# Patient Record
Sex: MALE | Race: WHITE | NOT HISPANIC OR LATINO | Employment: PART TIME | URBAN - METROPOLITAN AREA
[De-identification: names, ages, dates, MRNs, and addresses within clinical notes are randomized per-mention and may not be internally consistent; named-entity substitution may affect disease eponyms.]

---

## 2021-01-17 ENCOUNTER — HOSPITAL ENCOUNTER (EMERGENCY)
Facility: HOSPITAL | Age: 56
End: 2021-01-19
Attending: EMERGENCY MEDICINE
Payer: COMMERCIAL

## 2021-01-17 DIAGNOSIS — R45.851 SUICIDAL IDEATIONS: ICD-10-CM

## 2021-01-17 DIAGNOSIS — F10.20 ALCOHOL USE DISORDER, SEVERE, DEPENDENCE (HCC): Primary | ICD-10-CM

## 2021-01-17 DIAGNOSIS — F32.A DEPRESSION, UNSPECIFIED DEPRESSION TYPE: ICD-10-CM

## 2021-01-17 LAB
AMPHETAMINES SERPL QL SCN: NEGATIVE
BARBITURATES UR QL: NEGATIVE
BENZODIAZ UR QL: NEGATIVE
COCAINE UR QL: NEGATIVE
ETHANOL EXG-MCNC: NORMAL MG/DL
ETHANOL SERPL-MCNC: 380 MG/DL (ref 0–3)
METHADONE UR QL: NEGATIVE
OPIATES UR QL SCN: NEGATIVE
OXYCODONE+OXYMORPHONE UR QL SCN: NEGATIVE
PCP UR QL: NEGATIVE
THC UR QL: NEGATIVE

## 2021-01-17 PROCEDURE — 99285 EMERGENCY DEPT VISIT HI MDM: CPT

## 2021-01-17 PROCEDURE — 82075 ASSAY OF BREATH ETHANOL: CPT | Performed by: EMERGENCY MEDICINE

## 2021-01-17 PROCEDURE — 99285 EMERGENCY DEPT VISIT HI MDM: CPT | Performed by: EMERGENCY MEDICINE

## 2021-01-17 PROCEDURE — 80307 DRUG TEST PRSMV CHEM ANLYZR: CPT | Performed by: EMERGENCY MEDICINE

## 2021-01-17 PROCEDURE — 82077 ASSAY SPEC XCP UR&BREATH IA: CPT | Performed by: EMERGENCY MEDICINE

## 2021-01-17 PROCEDURE — 36415 COLL VENOUS BLD VENIPUNCTURE: CPT | Performed by: EMERGENCY MEDICINE

## 2021-01-17 RX ORDER — LORAZEPAM 1 MG/1
1 TABLET ORAL EVERY 6 HOURS PRN
Status: DISCONTINUED | OUTPATIENT
Start: 2021-01-17 | End: 2021-01-19 | Stop reason: HOSPADM

## 2021-01-17 RX ADMIN — LORAZEPAM 1 MG: 1 TABLET ORAL at 20:51

## 2021-01-18 LAB
ANION GAP SERPL CALCULATED.3IONS-SCNC: 13 MMOL/L (ref 4–13)
ATRIAL RATE: 113 BPM
BASOPHILS # BLD AUTO: 0.05 THOUSANDS/ΜL (ref 0–0.1)
BASOPHILS NFR BLD AUTO: 2 % (ref 0–1)
BILIRUB UR QL STRIP: NEGATIVE
BUN SERPL-MCNC: 13 MG/DL (ref 5–25)
CALCIUM SERPL-MCNC: 9.3 MG/DL (ref 8.3–10.1)
CHLORIDE SERPL-SCNC: 101 MMOL/L (ref 100–108)
CLARITY UR: CLEAR
CO2 SERPL-SCNC: 26 MMOL/L (ref 21–32)
COLOR UR: YELLOW
CREAT SERPL-MCNC: 0.73 MG/DL (ref 0.6–1.3)
EOSINOPHIL # BLD AUTO: 0.02 THOUSAND/ΜL (ref 0–0.61)
EOSINOPHIL NFR BLD AUTO: 1 % (ref 0–6)
ERYTHROCYTE [DISTWIDTH] IN BLOOD BY AUTOMATED COUNT: 12.9 % (ref 11.6–15.1)
FLUAV RNA RESP QL NAA+PROBE: NEGATIVE
FLUBV RNA RESP QL NAA+PROBE: NEGATIVE
GFR SERPL CREATININE-BSD FRML MDRD: 103 ML/MIN/1.73SQ M
GLUCOSE SERPL-MCNC: 97 MG/DL (ref 65–140)
GLUCOSE UR STRIP-MCNC: NEGATIVE MG/DL
HCT VFR BLD AUTO: 42.2 % (ref 36.5–49.3)
HGB BLD-MCNC: 14.9 G/DL (ref 12–17)
HGB UR QL STRIP.AUTO: NEGATIVE
IMM GRANULOCYTES # BLD AUTO: 0 THOUSAND/UL (ref 0–0.2)
IMM GRANULOCYTES NFR BLD AUTO: 0 % (ref 0–2)
KETONES UR STRIP-MCNC: ABNORMAL MG/DL
LEUKOCYTE ESTERASE UR QL STRIP: NEGATIVE
LYMPHOCYTES # BLD AUTO: 0.55 THOUSANDS/ΜL (ref 0.6–4.47)
LYMPHOCYTES NFR BLD AUTO: 19 % (ref 14–44)
MCH RBC QN AUTO: 32.9 PG (ref 26.8–34.3)
MCHC RBC AUTO-ENTMCNC: 35.3 G/DL (ref 31.4–37.4)
MCV RBC AUTO: 93 FL (ref 82–98)
MONOCYTES # BLD AUTO: 0.36 THOUSAND/ΜL (ref 0.17–1.22)
MONOCYTES NFR BLD AUTO: 13 % (ref 4–12)
NEUTROPHILS # BLD AUTO: 1.91 THOUSANDS/ΜL (ref 1.85–7.62)
NEUTS SEG NFR BLD AUTO: 65 % (ref 43–75)
NITRITE UR QL STRIP: NEGATIVE
NRBC BLD AUTO-RTO: 0 /100 WBCS
P AXIS: 69 DEGREES
PH UR STRIP.AUTO: 7 [PH]
PLATELET # BLD AUTO: 94 THOUSANDS/UL (ref 149–390)
PMV BLD AUTO: 9.9 FL (ref 8.9–12.7)
POTASSIUM SERPL-SCNC: 4.1 MMOL/L (ref 3.5–5.3)
PR INTERVAL: 130 MS
PROT UR STRIP-MCNC: NEGATIVE MG/DL
QRS AXIS: 73 DEGREES
QRSD INTERVAL: 92 MS
QT INTERVAL: 354 MS
QTC INTERVAL: 485 MS
RBC # BLD AUTO: 4.53 MILLION/UL (ref 3.88–5.62)
RSV RNA RESP QL NAA+PROBE: NEGATIVE
SARS-COV-2 RNA RESP QL NAA+PROBE: NEGATIVE
SODIUM SERPL-SCNC: 140 MMOL/L (ref 136–145)
SP GR UR STRIP.AUTO: 1.02 (ref 1–1.03)
T WAVE AXIS: 55 DEGREES
TSH SERPL DL<=0.05 MIU/L-ACNC: 0.77 UIU/ML (ref 0.36–3.74)
UROBILINOGEN UR QL STRIP.AUTO: 1 E.U./DL
VENTRICULAR RATE: 113 BPM
WBC # BLD AUTO: 2.89 THOUSAND/UL (ref 4.31–10.16)

## 2021-01-18 PROCEDURE — 84443 ASSAY THYROID STIM HORMONE: CPT | Performed by: EMERGENCY MEDICINE

## 2021-01-18 PROCEDURE — 96374 THER/PROPH/DIAG INJ IV PUSH: CPT

## 2021-01-18 PROCEDURE — 36415 COLL VENOUS BLD VENIPUNCTURE: CPT | Performed by: EMERGENCY MEDICINE

## 2021-01-18 PROCEDURE — 85025 COMPLETE CBC W/AUTO DIFF WBC: CPT | Performed by: EMERGENCY MEDICINE

## 2021-01-18 PROCEDURE — 0241U HB NFCT DS VIR RESP RNA 4 TRGT: CPT | Performed by: EMERGENCY MEDICINE

## 2021-01-18 PROCEDURE — 93010 ELECTROCARDIOGRAM REPORT: CPT | Performed by: INTERNAL MEDICINE

## 2021-01-18 PROCEDURE — 80048 BASIC METABOLIC PNL TOTAL CA: CPT | Performed by: EMERGENCY MEDICINE

## 2021-01-18 PROCEDURE — 81003 URINALYSIS AUTO W/O SCOPE: CPT | Performed by: EMERGENCY MEDICINE

## 2021-01-18 PROCEDURE — 93005 ELECTROCARDIOGRAM TRACING: CPT

## 2021-01-18 RX ORDER — LORAZEPAM 2 MG/ML
4 INJECTION INTRAMUSCULAR ONCE
Status: COMPLETED | OUTPATIENT
Start: 2021-01-18 | End: 2021-01-18

## 2021-01-18 RX ADMIN — LORAZEPAM 4 MG: 2 INJECTION INTRAMUSCULAR; INTRAVENOUS at 11:21

## 2021-01-18 RX ADMIN — LORAZEPAM 1 MG: 1 TABLET ORAL at 23:17

## 2021-01-18 RX ADMIN — LORAZEPAM 1 MG: 1 TABLET ORAL at 17:45

## 2021-01-18 NOTE — ED NOTES
Pt room clear of safety hazards  Patient placed on 1:1 observation and was placed in paper scrubs per hospital policy        Bri Ge  01/17/21 2019

## 2021-01-18 NOTE — ED NOTES
Pt OOB and ambulatory to the bathroom  Pt provided a urine sample and was given the tv remote        GroundLinkrita Search  01/18/21 2370

## 2021-01-18 NOTE — ED PROVIDER NOTES
History  Chief Complaint   Patient presents with    Psychiatric Evaluation     suicidal ideation found with guns and knives sts he is gonna kill himself in 24 hrs      68-year-old male presents for psychiatric evaluation  The patient arrives in police custody under a 302 petition  36 petition is from the patient's mother who states that over the past several days the patient has been making comments through text messages to family members that he cannot take it anymore and is going to [place] off into the woods and ended    He has also made comments suggesting that family members should self some of his personal items after he has gone  The patient admits the same to me on my interview  He states that he has had lots of problems with carpal tunnel in his right wrist and can no longer work as a   He states that he can no longer live with the pain and is just done with it all    He does own multiple firearms and refers to one of them as his baby    He also admits to drinking vodka today  He denies any other ingestions or illicit drugs  History provided by:  Patient   used: No    Psychiatric Evaluation      None       Past Medical History:   Diagnosis Date    Substance abuse [facility])        Past Surgical History:   Procedure Laterality Date    CARPAL TUNNEL RELEASE Right 12/2020       Family History   Problem Relation Age of Onset    No Known Problems Mother     Cancer Father      I have reviewed and agree with the history as documented      E-Cigarette/Vaping    E-Cigarette Use Never User      E-Cigarette/Vaping Substances    Nicotine No     THC No     CBD No     Flavoring No     Other No     Unknown No      Social History     Tobacco Use    Smoking status: Current Every Day Smoker     Packs/day: 1 00    Smokeless tobacco: Never Used   Substance Use Topics    Alcohol use: Yes     Frequency: 4 or more times a week     Drinks per session: 10 or more     Comment: rufino reprots drinking daily about a "handle" of vodka straight     Drug use: Not Currently       Review of Systems   Unable to perform ROS: Psychiatric disorder       Physical Exam  Physical Exam  Vitals signs and nursing note reviewed  Constitutional:       Appearance: He is well-developed  HENT:      Head: Normocephalic and atraumatic  Eyes:      Conjunctiva/sclera: Conjunctivae normal    Neck:      Musculoskeletal: Neck supple  Cardiovascular:      Rate and Rhythm: Regular rhythm  Tachycardia present  Heart sounds: No murmur  Pulmonary:      Effort: Pulmonary effort is normal  No respiratory distress  Breath sounds: Normal breath sounds  Abdominal:      Palpations: Abdomen is soft  Tenderness: There is no abdominal tenderness  Skin:     General: Skin is warm and dry  Capillary Refill: Capillary refill takes less than 2 seconds  Neurological:      General: No focal deficit present  Mental Status: He is alert and oriented to person, place, and time  Psychiatric:      Comments: Appears intoxicated and admits to drinking alcohol earlier today  Reports suicidal ideation with plan to shoot himself in the head           Vital Signs  ED Triage Vitals   Temperature Pulse Respirations Blood Pressure SpO2   01/17/21 1950 01/17/21 1950 01/17/21 1950 01/17/21 1950 01/17/21 1950   98 2 °F (36 8 °C) (!) 121 22 (!) 163/108 98 %      Temp Source Heart Rate Source Patient Position - Orthostatic VS BP Location FiO2 (%)   01/17/21 1950 01/17/21 1950 01/17/21 1950 01/17/21 1950 --   Oral Monitor Lying Right arm       Pain Score       01/18/21 0000       No Pain           Vitals:    01/19/21 0800 01/19/21 1104 01/19/21 1500 01/19/21 1900   BP: 118/80 138/73 123/82 (!) 157/107   Pulse: 87 97 77 (!) 107   Patient Position - Orthostatic VS:  Lying Lying Lying         Visual Acuity      ED Medications  Medications   LORazepam (ATIVAN) injection 4 mg (4 mg Intravenous Given 1/18/21 1121) Diagnostic Studies  Results Reviewed     Procedure Component Value Units Date/Time    UA w Reflex to Microscopic w Reflex to Culture [[id number]]  (Abnormal) Collected: 01/18/21 1715    Lab Status: Final result Specimen: Urine, Clean Catch Updated: 01/18/21 1753     Color, UA Yellow     Clarity, UA Clear     Specific [place], UA 1 020     pH, UA 7 0     Leukocytes, UA Negative     Nitrite, UA Negative     Protein, UA Negative mg/dl      Glucose, UA Negative mg/dl      Ketones, UA >=80 (3+) mg/dl      Urobilinogen, UA 1 0 E U /dl      Bilirubin, UA Negative     Blood, UA Negative    Basic metabolic panel [[id number]] Collected: 01/18/21 1316    Lab Status: Final result Specimen: Blood from Arm, Left Updated: 01/18/21 1343     Sodium 140 mmol/L      Potassium 4 1 mmol/L      Chloride 101 mmol/L      CO2 26 mmol/L      ANION GAP 13 mmol/L      BUN 13 mg/dL      Creatinine 0 73 mg/dL      Glucose 97 mg/dL      Calcium 9 3 mg/dL      eGFR 103 ml/min/1 73sq m     Narrative:      [place] guidelines for Chronic Kidney Disease (CKD):     Stage 1 with normal or high GFR (GFR > 90 mL/min/1 73 square meters)    Stage 2 Mild CKD (GFR = 60-89 mL/min/1 73 square meters)    Stage 3A Moderate CKD (GFR = 45-59 mL/min/1 73 square meters)    Stage 3B Moderate CKD (GFR = 30-44 mL/min/1 73 square meters)    Stage 4 Severe CKD (GFR = 15-29 mL/min/1 73 square meters)    Stage 5 End Stage CKD (GFR <15 mL/min/1 73 square meters)  Note: GFR calculation is accurate only with a steady state creatinine    TSH, 3rd generation with Free T4 reflex [[id number]]  (Normal) Collected: 01/18/21 1316    Lab Status: Final result Specimen: Blood from Arm, Left Updated: 01/18/21 1343     TSH 3RD GENERATON 0 774 uIU/mL     Narrative:      Patients undergoing fluorescein dye angiography may retain small amounts of fluorescein in the body for 48-72 hours post procedure   Samples containing fluorescein can produce falsely depressed TSH values  If the patient had this procedure,a specimen should be resubmitted post fluorescein clearance  CBC and differential [[id number]]  (Abnormal) Collected: 01/18/21 1225    Lab Status: Edited Result - FINAL Specimen: Blood from Arm, Left Updated: 01/18/21 1333     WBC 2 89 Thousand/uL      RBC 4 53 Million/uL      Hemoglobin 14 9 g/dL      Hematocrit 42 2 %      MCV 93 fL      MCH 32 9 pg      MCHC 35 3 g/dL      RDW 12 9 %      MPV 9 9 fL      Platelets 94 Thousands/uL      nRBC 0 /100 WBCs      Neutrophils Relative 65 %      Immat GRANS % 0 %      Lymphocytes Relative 19 %      Monocytes Relative 13 %      Eosinophils Relative 1 %      Basophils Relative 2 %      Neutrophils Absolute 1 91 Thousands/µL      Immature Grans Absolute 0 00 Thousand/uL      Lymphocytes Absolute 0 55 Thousands/µL      Monocytes Absolute 0 36 Thousand/µL      Eosinophils Absolute 0 02 Thousand/µL      Basophils Absolute 0 05 Thousands/µL     COVID19, Influenza A/B, RSV PCR, [facility] [[id number]]  (Normal) Collected: 01/18/21 1137    Lab Status: Final result Specimen: Nares from Nasopharyngeal Swab Updated: 01/18/21 1310     SARS-CoV-2 Negative     INFLUENZA A PCR Negative     INFLUENZA B PCR Negative     RSV PCR Negative    Narrative: This test has been authorized by FDA under an EUA (Emergency Use Assay) for use by authorized laboratories  Clinical caution and judgement should be used with the interpretation of these results with consideration of the clinical impression and other laboratory testing  Testing reported as "Positive" or "Negative" has been proven to be accurate according to standard laboratory validation requirements  All testing is performed with control materials showing appropriate reactivity at standard intervals      Ethanol [[id number]]  (Abnormal) Collected: 01/17/21 2041    Lab Status: Final result Specimen: Blood from Arm, Left Updated: 01/17/21 2124     Ethanol Lvl 380 mg/dL     Rapid drug screen, urine [[id number]]  (Normal) Collected: 01/17/21 2015    Lab Status: Final result Specimen: Urine, Clean Catch Updated: 01/17/21 2045     Amph/Meth UR Negative     Barbiturate Ur Negative     Benzodiazepine Urine Negative     Cocaine Urine Negative     Methadone Urine Negative     Opiate Urine Negative     PCP Ur Negative     THC Urine Negative     Oxycodone Urine Negative    Narrative:      FOR MEDICAL PURPOSES ONLY  IF CONFIRMATION NEEDED PLEASE CONTACT THE LAB WITHIN 5 DAYS  Drug Screen Cutoff Levels:  AMPHETAMINE/METHAMPHETAMINES  1000 ng/mL  BARBITURATES     200 ng/mL  BENZODIAZEPINES     200 ng/mL  COCAINE      300 ng/mL  METHADONE      300 ng/mL  OPIATES      300 ng/mL  PHENCYCLIDINE     25 ng/mL  THC       50 ng/mL  OXYCODONE      100 ng/mL    POCT alcohol breath test [[id number]]  (Normal) Resulted: 01/17/21 2021    Lab Status: Final result Updated: 01/17/21 2021     EXTBreath Alcohol Unable to obtain                 No orders to display              Procedures  Procedures         ED Course                             SBIRT 20yo+      Most Recent Value   SBIRT (25 yo +)   In order to provide better care to our patients, we are screening all of our patients for alcohol and drug use  Would it be okay to ask you these screening questions? Yes Filed at: 01/17/2021 2021   Initial Alcohol Screen: US AUDIT-C    1  How often do you have a drink containing alcohol? 6 Filed at: 01/17/2021 2021   2  How many drinks containing alcohol do you have on a typical day you are drinking? 6 Filed at: 01/17/2021 2021   3a  Male UNDER 65: How often do you have five or more drinks on one occasion? 6 Filed at: 01/17/2021 2021   3b  FEMALE Any Age, or MALE 65+: How often do you have 4 or more drinks on one occassion? 6 Filed at: 01/17/2021 1953   Audit-C Score  (!) 18 Filed at: 01/17/2021 2021   Full Alcohol Screen: US AUDIT   4   How often during the last year have you found that you were not able to stop drinking once you had started? 4 Filed at: 01/17/2021 2021   5  How often during past year have you failed to do what was normally expected of you because of drinking? 4 Filed at: 01/17/2021 2021   6  How often in past year have you needed a first drink in the morning to get yourself going after a heavy drinking session? 4 Filed at: 01/17/2021 2021   7  How often in past year have you had feeling of guilt or remorse after drinking? 4 Filed at: 01/17/2021 2021   8  How often in past year have you been unable to remember what happened night before because you had been drinking? 4 Filed at: 01/17/2021 2021   9  Have you or someone else been injured as a result of your drinking? 0 Filed at: 01/17/2021 2021   10  Has a relative, friend, doctor or other health worker been concerned about your drinking and suggested you cut down? 4 Filed at: 01/17/2021 2021   AUDIT Total Score  (!) 42 Filed at: 01/17/2021 2021   JAE: How many times in the past year have you    Used an illegal drug or used a prescription medication for non-medical reasons? Once or Twice Filed at: 01/17/2021 2021   DAST-10: In the past 12 months      1  Have you used drugs other than those required for medical reasons? 1 Filed at: 01/17/2021 2021   2  Do you use more than one drug at a time? 1 Filed at: 01/17/2021 2021   3  Have you had medical problems as a result of your drug use (e g , memory loss, hepatitis, convulsions, bleeding, etc )? 0 Filed at: 01/17/2021 2021   4  Have you had "blackouts" or "flashbacks" as a result of drug use? YesNo  0 Filed at: 01/17/2021 2021   5  Do you ever feel bad or guilty about your drug use? 1 Filed at: 01/17/2021 2021   6  Does your spouse (or parent) ever complain about your involvement with drugs? 0 Filed at: 01/17/2021 2021   7  Have you neglected your family because of your use of drugs? 0 Filed at: 01/17/2021 2021   8  Have you engaged in illegal activities in order to obtain drugs?   0 Filed at: 01/17/2021 2021   9  Have you ever experienced withdrawal symptoms (felt sick) when you stopped taking drugs? 0 Filed at: 01/17/2021 2021   10  Are you always able to stop using drugs when you want to?  0 Filed at: 01/17/2021 2021   DAST-10 Score  3 Filed at: 01/17/2021 2021                    MDM  Number of Diagnoses or Management Options  Depression, unspecified depression type: new and requires workup  Suicidal ideations: new and requires workup     Amount and/or Complexity of Data Reviewed  Clinical lab tests: reviewed and ordered  Review and summarize past medical records: yes        Disposition  Final diagnoses:   Depression, unspecified depression type   Suicidal ideations     Time reflects when diagnosis was documented in both MDM as applicable and the Disposition within this note     Time User Action Codes Description Comment    1/19/2021  1:02 PM Jeni Mccollum [address] [F10 20] Alcohol use disorder, severe, dependence ([facility] [address] )     1/19/2021  2:55 PM Jonah GOMEZ Add [F32 9] Depression, unspecified depression type     1/19/2021  2:55 PM Mora Wood Add [I28 058] Suicidal ideations       ED Disposition     ED Disposition Condition Date/Time Comment    Transfer to [address] [place] [address] Jan 19, 2021  1:08 PM [facility] should be transferred out to [facility] and has been medically cleared          MD Documentation      Most Recent Value   Patient Condition  The patient has been stabilized such that within reasonable medical probability, no material deterioration of the patient condition or the condition of the unborn child(yeny) is likely to result from the transfer   Reason for Transfer  Level of Care needed not available at this facility   Benefits of Transfer  -- [Psychiatric treatment]   Risks of Transfer  Potential for delay in receiving treatment, Potential deterioration of medical condition, Loss of IV [Transport]   Accepting Physician  Dr Juan Brown, [facility]     [facility]   Krupa Devine   Provider Certification  General risk, such as traffic hazards, adverse weather conditions, rough terrain or turbulence, possible failure of equipment (including vehicle or aircraft), or consequences of actions of persons outside the control of the transport personnel, Unanticipated needs of medical equipment and personnel during transport, Risk of worsening condition, The possibility of a transport vehicle being unavailable      RN Documentation      Most Recent Value   Accepting Facility Name, [address]     [facility]      Follow-up Information    None         There are no discharge medications for this patient  No discharge procedures on file      PDMP Review     None          ED Provider  Electronically Signed by           Leandro Sim MD  01/27/21 4548

## 2021-01-18 NOTE — ED NOTES
EVS (Eligibility Verification System) called - [phone]    Automated system indicates: patient not eligible for MA

## 2021-01-18 NOTE — ED NOTES
Patient eating the rest of his breakfast at this time   Refused to order anything else for lunch     Rajinder Spring RN  01/18/21 2056

## 2021-01-18 NOTE — ED NOTES
CW met with patient who presented due to suicidal threats and an increase in alcohol consumption  Patient signed a 12 and is agreeable to IP treatment for his depression at this time  Patient reported that he has been out of work for about 8 weeks due to carpal tunnel surgery, he started drinking heavily daily about 4 weeks ago  Patient reports he is a  and his hand isnt better and he is stressed out because he feels he cant work anymore, he was to go back light duty over the weekend and did not show up  His family was concerned and petitioned a 36 after receiving messages about wanting to harm self  Patient does report feeling hopeless and that it would be better off if he wasn't here but denies a plan        JK-CIS

## 2021-01-19 ENCOUNTER — HOSPITAL ENCOUNTER (INPATIENT)
Facility: HOSPITAL | Age: 56
LOS: 10 days | Discharge: OTHER INSTITUTION | DRG: 885 | End: 2021-01-29
Attending: PSYCHIATRY & NEUROLOGY | Admitting: PSYCHIATRY & NEUROLOGY
Payer: COMMERCIAL

## 2021-01-19 VITALS
BODY MASS INDEX: 18.64 KG/M2 | DIASTOLIC BLOOD PRESSURE: 107 MMHG | HEART RATE: 107 BPM | RESPIRATION RATE: 18 BRPM | OXYGEN SATURATION: 100 % | HEIGHT: 68 IN | SYSTOLIC BLOOD PRESSURE: 157 MMHG | TEMPERATURE: 98.6 F | WEIGHT: 123 LBS

## 2021-01-19 DIAGNOSIS — F10.20 ALCOHOL USE DISORDER, SEVERE, DEPENDENCE (HCC): ICD-10-CM

## 2021-01-19 DIAGNOSIS — F33.1 MDD (MAJOR DEPRESSIVE DISORDER), RECURRENT EPISODE, MODERATE (HCC): Primary | ICD-10-CM

## 2021-01-19 DIAGNOSIS — Z72.0 NICOTINE ABUSE: Chronic | ICD-10-CM

## 2021-01-19 PROBLEM — Z00.8 MEDICAL CLEARANCE FOR PSYCHIATRIC ADMISSION: Status: ACTIVE | Noted: 2021-01-19

## 2021-01-19 PROBLEM — M25.531 RIGHT WRIST PAIN: Status: ACTIVE | Noted: 2021-01-19

## 2021-01-19 PROBLEM — F10.10 ALCOHOL ABUSE: Status: ACTIVE | Noted: 2021-01-19

## 2021-01-19 PROCEDURE — 99254 IP/OBS CNSLTJ NEW/EST MOD 60: CPT | Performed by: PHYSICIAN ASSISTANT

## 2021-01-19 RX ORDER — HALOPERIDOL 5 MG/ML
5 INJECTION INTRAMUSCULAR EVERY 6 HOURS PRN
Status: CANCELLED | OUTPATIENT
Start: 2021-01-19

## 2021-01-19 RX ORDER — NICOTINE 21 MG/24HR
21 PATCH, TRANSDERMAL 24 HOURS TRANSDERMAL
Status: DISCONTINUED | OUTPATIENT
Start: 2021-01-19 | End: 2021-01-29 | Stop reason: HOSPADM

## 2021-01-19 RX ORDER — FOLIC ACID 1 MG/1
1 TABLET ORAL DAILY
Status: DISCONTINUED | OUTPATIENT
Start: 2021-01-20 | End: 2021-01-29 | Stop reason: HOSPADM

## 2021-01-19 RX ORDER — LORAZEPAM 2 MG/ML
2 INJECTION INTRAMUSCULAR EVERY 4 HOURS PRN
Status: DISCONTINUED | OUTPATIENT
Start: 2021-01-19 | End: 2021-01-29 | Stop reason: HOSPADM

## 2021-01-19 RX ORDER — ACETAMINOPHEN 325 MG/1
650 TABLET ORAL EVERY 6 HOURS PRN
Status: CANCELLED | OUTPATIENT
Start: 2021-01-19

## 2021-01-19 RX ORDER — LORAZEPAM 2 MG/ML
2 INJECTION INTRAMUSCULAR EVERY 4 HOURS PRN
Status: CANCELLED | OUTPATIENT
Start: 2021-01-19

## 2021-01-19 RX ORDER — TRAZODONE HYDROCHLORIDE 50 MG/1
50 TABLET ORAL
Status: CANCELLED | OUTPATIENT
Start: 2021-01-19

## 2021-01-19 RX ORDER — ALBUTEROL SULFATE 90 UG/1
1 AEROSOL, METERED RESPIRATORY (INHALATION) EVERY 6 HOURS PRN
COMMUNITY

## 2021-01-19 RX ORDER — RISPERIDONE 1 MG/1
2 TABLET, ORALLY DISINTEGRATING ORAL
Status: DISCONTINUED | OUTPATIENT
Start: 2021-01-19 | End: 2021-01-29 | Stop reason: HOSPADM

## 2021-01-19 RX ORDER — TRAZODONE HYDROCHLORIDE 50 MG/1
50 TABLET ORAL
Status: DISCONTINUED | OUTPATIENT
Start: 2021-01-19 | End: 2021-01-29 | Stop reason: HOSPADM

## 2021-01-19 RX ORDER — NICOTINE 21 MG/24HR
21 PATCH, TRANSDERMAL 24 HOURS TRANSDERMAL ONCE
Status: DISCONTINUED | OUTPATIENT
Start: 2021-01-19 | End: 2021-01-19 | Stop reason: HOSPADM

## 2021-01-19 RX ORDER — IBUPROFEN 600 MG/1
600 TABLET ORAL EVERY 8 HOURS PRN
Status: CANCELLED | OUTPATIENT
Start: 2021-01-19

## 2021-01-19 RX ORDER — THIAMINE MONONITRATE (VIT B1) 100 MG
100 TABLET ORAL DAILY
Status: DISCONTINUED | OUTPATIENT
Start: 2021-01-20 | End: 2021-01-29 | Stop reason: HOSPADM

## 2021-01-19 RX ORDER — LIDOCAINE 50 MG/G
1 PATCH TOPICAL DAILY
Status: DISCONTINUED | OUTPATIENT
Start: 2021-01-19 | End: 2021-01-29 | Stop reason: HOSPADM

## 2021-01-19 RX ORDER — HYDROXYZINE HYDROCHLORIDE 25 MG/1
50 TABLET, FILM COATED ORAL EVERY 4 HOURS PRN
Status: CANCELLED | OUTPATIENT
Start: 2021-01-19

## 2021-01-19 RX ORDER — HYDROXYZINE HYDROCHLORIDE 25 MG/1
50 TABLET, FILM COATED ORAL EVERY 4 HOURS PRN
Status: DISCONTINUED | OUTPATIENT
Start: 2021-01-19 | End: 2021-01-29 | Stop reason: HOSPADM

## 2021-01-19 RX ORDER — IBUPROFEN 600 MG/1
600 TABLET ORAL EVERY 8 HOURS PRN
Status: DISCONTINUED | OUTPATIENT
Start: 2021-01-19 | End: 2021-01-29 | Stop reason: HOSPADM

## 2021-01-19 RX ORDER — RISPERIDONE 1 MG/1
2 TABLET, ORALLY DISINTEGRATING ORAL
Status: CANCELLED | OUTPATIENT
Start: 2021-01-19

## 2021-01-19 RX ORDER — HALOPERIDOL 5 MG/ML
5 INJECTION INTRAMUSCULAR EVERY 6 HOURS PRN
Status: DISCONTINUED | OUTPATIENT
Start: 2021-01-19 | End: 2021-01-29 | Stop reason: HOSPADM

## 2021-01-19 RX ORDER — ACETAMINOPHEN 325 MG/1
650 TABLET ORAL EVERY 6 HOURS PRN
Status: DISCONTINUED | OUTPATIENT
Start: 2021-01-19 | End: 2021-01-29 | Stop reason: HOSPADM

## 2021-01-19 RX ADMIN — LORAZEPAM 2 MG: 2 INJECTION INTRAMUSCULAR; INTRAVENOUS at 23:45

## 2021-01-19 RX ADMIN — NICOTINE 21 MG: 21 PATCH, EXTENDED RELEASE TRANSDERMAL at 23:05

## 2021-01-19 RX ADMIN — NICOTINE 21 MG: 21 PATCH, EXTENDED RELEASE TRANSDERMAL at 14:40

## 2021-01-19 RX ADMIN — RISPERIDONE 2 MG: 1 TABLET, ORALLY DISINTEGRATING ORAL at 21:45

## 2021-01-19 RX ADMIN — LIDOCAINE 1 PATCH: 50 PATCH TOPICAL at 23:06

## 2021-01-19 RX ADMIN — IBUPROFEN 600 MG: 600 TABLET, FILM COATED ORAL at 23:14

## 2021-01-19 NOTE — ED NOTES
CW placed call to [facility] spoke boy/Mitra  As per Leah Johnson will return call w/OSORIO once available      Nurse to nurse report required to [phone], 1 hr prior to pt's arrival    TDS, [address]

## 2021-01-19 NOTE — ED NOTES
Message received from Mulugeta stating their doctor denied due to low WBC  Bed search to continue      Annelle Galeazzi, [facility]  01/18/21   6348

## 2021-01-19 NOTE — ED NOTES
CW placed call to Ellen, [phone], spoke w/Errol  As per Sigrid Husbands to complete pg 1 of [facility] forms and return to [phone]  As per Burak Lewis, once form is received, a care manager should call back within the hour of being received   SENT    TDS, JEANNETTE

## 2021-01-19 NOTE — EMTALA/ACUTE CARE TRANSFER
[address] [facility] [address]  [address] [place] [address]  Dept: [phone]      EMTALA TRANSFER CONSENT    NAME 120Jaci Patricio                                         1965                              MRN [id number]    I have been informed of my rights regarding examination, treatment, and transfer   by Dr Vida Yousif DO    Benefits: (Psychiatric treatment)    Risks: Potential for delay in receiving treatment, Potential deterioration of medical condition, Loss of IV(Transport)      Consent for Transfer:  I acknowledge that my medical condition has been evaluated and explained to me by the emergency department physician or other qualified medical person and/or my attending physician, who has recommended that I be transferred to the service of  Accepting Physician: Dr Sam Thacker at [address] Name, [address] : [facility]  The above potential benefits of such transfer, the potential risks associated with such transfer, and the probable risks of not being transferred have been explained to me, and I fully understand them  The doctor has explained that, in my case, the benefits of transfer outweigh the risks  I agree to be transferred  I authorize the performance of emergency medical procedures and treatments upon me in both transit and upon arrival at the receiving facility  Additionally, I authorize the release of any and all medical records to the receiving facility and request they be transported with me, if possible  I understand that the safest mode of transportation during a medical emergency is an ambulance and that the Hospital advocates the use of this mode of transport  Risks of traveling to the receiving facility by car, including absence of medical control, life sustaining equipment, such as oxygen, and medical personnel has been explained to me and I fully understand them      ([address])  [  ]  I consent to the stated transfer and to be transported by ambulance/helicopter  [  ]  I consent to the stated transfer, but refuse transportation by ambulance and accept full responsibility for my transportation by car  I understand the risks of non-ambulance transfers and I exonerate the Hospital and its staff from any deterioration in my condition that results from this refusal     X___________________________________________    DATE  21  TIME________  Signature of patient or legally responsible individual signing on patient behalf           RELATIONSHIP TO PATIENT_________________________          Provider Certification    NAME Lizandro Noe                                         1965                              MRN [id number]    A medical screening exam was performed on the above named patient  Based on the examination:    Condition Necessitating Transfer The encounter diagnosis was Alcohol use disorder, severe, dependence ([facility] [address] )  Patient Condition: The patient has been stabilized such that within reasonable medical probability, no material deterioration of the patient condition or the condition of the unborn child(yeny) is likely to result from the transfer    Reason for Transfer: Level of Care needed not available at this facility    Transfer Requirements: [facility]   · Space available and qualified personnel available for treatment as acknowledged by    · Agreed to accept transfer and to provide appropriate medical treatment as acknowledged by       Dr Linda Matthews  · Appropriate medical records of the examination and treatment of the patient are provided at the time of transfer   [address], [address] _______  · Transfer will be performed by qualified personnel from    and appropriate transfer equipment as required, including the use of necessary and appropriate life support measures      Provider Certification: I have examined the patient and explained the following risks and benefits of being transferred/refusing transfer to the patient/family:  General risk, such as traffic hazards, adverse weather conditions, rough terrain or turbulence, possible failure of equipment (including vehicle or aircraft), or consequences of actions of persons outside the control of the transport personnel, Unanticipated needs of medical equipment and personnel during transport, Risk of worsening condition, The possibility of a transport vehicle being unavailable      Based on these reasonable risks and benefits to the patient and/or the unborn child(yeny), and based upon the information available at the time of the patients examination, I certify that the medical benefits reasonably to be expected from the provision of appropriate medical treatments at another medical facility outweigh the increasing risks, if any, to the individuals medical condition, and in the case of labor to the unborn child, from effecting the transfer      X____________________________________________ DATE 01/19/21        TIME_______      ORIGINAL - SEND TO MEDICAL RECORDS   COPY - SEND WITH PATIENT DURING TRANSFER

## 2021-01-19 NOTE — ED NOTES
CW placed calls to the following facilities:    [address], spoke w/Han  CW may send clinicals for review  [address] [facility], [id number], [address] [place] [address], [id number], spoke w/Vera  NO BEDS, may try again after 12  Yary Crespo, [phone], spoke w/José Antonio, NO ADULT [place], [phone], [address] [facility], [phone], [address] may send clinicals for review   SENT    TDS, JEANNETTE

## 2021-01-19 NOTE — ED NOTES
Transport information:   @ [address]  Accepting Physician: Dr Kim Philippe  Number for report: [phone]       Lorie Pugh, RN  01/19/21 7416

## 2021-01-19 NOTE — ED NOTES
Carol Brian denied patient because patient is not medically stable; can re refer when levels improve

## 2021-01-19 NOTE — ED NOTES
Pt breakfast tray delivered  Pt provided with warm blankets and laid back down at this time        Jannetta Olszewski  01/19/21 3081

## 2021-01-19 NOTE — ED NOTES
CW sent clinicals to intake for review  Pt has been reviewed at [facility] facilities who are reporting concerns of pt's medical issues      TDS, JEANNETTE

## 2021-01-19 NOTE — ED CARE HANDOFF
Emergency Department Sign Out Note        Sign out and transfer of care from Dr Echo Woody See Separate Emergency Department note  The patient, José Luis Harrington, was evaluated by the previous provider for depression  Workup Completed: Follow-up crisis consultation    ED Course / Workup Pending (followup): Patient medically cleared for inpatient psychiatric placement                               Procedures  MDM    Disposition  Final diagnoses:   Alcohol use disorder, severe, dependence ([facility] [address] )     Time reflects when diagnosis was documented in both MDM as applicable and the Disposition within this note     Time User Action Codes Description Comment    1/19/2021  1:02 PM Jeni Mccollum [address] [F10 20] Alcohol use disorder, severe, dependence [facility])       ED Disposition     ED Disposition Condition Date/Time Comment    Transfer to [address] [place] [address] Jan 19, 2021  1:08 PM José Luis Clos should be transferred out to [facility] and has been medically cleared          MD Documentation      Most Recent Value   Patient Condition  The patient has been stabilized such that within reasonable medical probability, no material deterioration of the patient condition or the condition of the unborn child(yeny) is likely to result from the transfer   Reason for Transfer  Level of Care needed not available at this facility   Benefits of Transfer  -- [Psychiatric treatment]   Risks of Transfer  Potential for delay in receiving treatment, Potential deterioration of medical condition, Loss of IV [Transport]   Accepting Physician  Dr Nehemias Barber Name, [facility]   Sending MD Paulie Sutton   Provider Certification  General risk, such as traffic hazards, adverse weather conditions, rough terrain or turbulence, possible failure of equipment (including vehicle or aircraft), or consequences of actions of persons outside the control of the transport personnel, Unanticipated needs of medical equipment and personnel during transport, Risk of worsening condition, The possibility of a transport vehicle being unavailable      RN Documentation      Most [address] [facility] [address] Name, [facility]      Follow-up Information    None       Patient's Medications    No medications on file     No discharge procedures on file         ED Provider  Electronically Signed by     Delia Anderson DO  01/19/21 8907

## 2021-01-19 NOTE — ED NOTES
CW notes, pt has been accepted under the care of Dr Jake Solis to [facility]  Intake will advise when to set up transport shortly      TDS, CW

## 2021-01-19 NOTE — ED NOTES
CW placed call to [facility], [phone], spoke w/Errol  As per Matthew Willis, member has active coverage  CW will need accepting facility prior to completing pre-cert      TDS, CW

## 2021-01-19 NOTE — ED NOTES
CW received vm from pt's mother inquiring on pt's update  Pt's mother, Cristina Wallis, can be reached at: [phone]  CW did not speak w/pt regarding consent to share information  Pt is actively gone to take a shower        TDS, CW

## 2021-01-19 NOTE — ED NOTES
Per EVS, patient is inactive with MA coverage  Patient's [facility] was verified active via Kayleen  He has a [employer], however, per Kayleen that plan termed on 07/01/2016      Tato Deutsch, LSW  01/18/21 2036

## 2021-01-19 NOTE — ED NOTES
Dual bed search to following facilities:     [facility]: No male beds available  [facility]: No dual beds available  [facility]: No beds available  [facility]: Beds available; chart faxed for review  [facility]: No beds available  [facility]: No beds available       BANDAR Lloyd  01/18/21 2042

## 2021-01-19 NOTE — ED NOTES
Pt brought back to [address], dinner tray ordered     Joan Martinez, BARRY  01/19/21 2535

## 2021-01-20 PROBLEM — F33.1 MDD (MAJOR DEPRESSIVE DISORDER), RECURRENT EPISODE, MODERATE (HCC): Status: ACTIVE | Noted: 2021-01-20

## 2021-01-20 PROBLEM — F10.20 ALCOHOL USE DISORDER, MODERATE, DEPENDENCE (HCC): Status: ACTIVE | Noted: 2021-01-20

## 2021-01-20 LAB
ALBUMIN SERPL BCP-MCNC: 4.3 G/DL (ref 3.5–5.7)
ALP SERPL-CCNC: 76 U/L (ref 40–150)
ALT SERPL W P-5'-P-CCNC: 72 U/L (ref 7–52)
ANION GAP SERPL CALCULATED.3IONS-SCNC: 11 MMOL/L (ref 4–13)
APTT PPP: 27 SECONDS (ref 23–37)
APTT PPP: 29 SECONDS (ref 23–37)
AST SERPL W P-5'-P-CCNC: 115 U/L (ref 13–39)
BILIRUB SERPL-MCNC: 1.3 MG/DL (ref 0.2–1)
BUN SERPL-MCNC: 12 MG/DL (ref 7–25)
CALCIUM SERPL-MCNC: 10.1 MG/DL (ref 8.6–10.5)
CHLORIDE SERPL-SCNC: 99 MMOL/L (ref 98–107)
CO2 SERPL-SCNC: 27 MMOL/L (ref 21–31)
CREAT SERPL-MCNC: 0.61 MG/DL (ref 0.7–1.3)
FIBRINOGEN PPP-MCNC: 298 MG/DL (ref 227–495)
GFR SERPL CREATININE-BSD FRML MDRD: 111 ML/MIN/1.73SQ M
GLUCOSE P FAST SERPL-MCNC: 86 MG/DL (ref 65–99)
GLUCOSE SERPL-MCNC: 86 MG/DL (ref 65–99)
INR PPP: 1 (ref 0.84–1.19)
MAGNESIUM SERPL-MCNC: 1.6 MG/DL (ref 1.9–2.7)
PLATELET # BLD AUTO: 78 THOUSANDS/UL (ref 149–390)
POTASSIUM SERPL-SCNC: 3.3 MMOL/L (ref 3.5–5.5)
PROT SERPL-MCNC: 6.5 G/DL (ref 6.4–8.9)
PROTHROMBIN TIME: 13 SECONDS (ref 11.6–14.5)
PROTHROMBIN TIME: 13.1 SECONDS (ref 11.6–14.5)
SODIUM SERPL-SCNC: 137 MMOL/L (ref 134–143)
THROMBIN TIME: 16.1 SECONDS (ref 14.7–18.4)

## 2021-01-20 PROCEDURE — 83735 ASSAY OF MAGNESIUM: CPT | Performed by: PHYSICIAN ASSISTANT

## 2021-01-20 PROCEDURE — 80053 COMPREHEN METABOLIC PANEL: CPT | Performed by: PHYSICIAN ASSISTANT

## 2021-01-20 PROCEDURE — 85732 THROMBOPLASTIN TIME PARTIAL: CPT | Performed by: PHYSICIAN ASSISTANT

## 2021-01-20 PROCEDURE — 85049 AUTOMATED PLATELET COUNT: CPT | Performed by: PHYSICIAN ASSISTANT

## 2021-01-20 PROCEDURE — 85670 THROMBIN TIME PLASMA: CPT | Performed by: PHYSICIAN ASSISTANT

## 2021-01-20 PROCEDURE — 85384 FIBRINOGEN ACTIVITY: CPT | Performed by: PHYSICIAN ASSISTANT

## 2021-01-20 PROCEDURE — 85611 PROTHROMBIN TEST: CPT | Performed by: PHYSICIAN ASSISTANT

## 2021-01-20 PROCEDURE — 85610 PROTHROMBIN TIME: CPT | Performed by: PHYSICIAN ASSISTANT

## 2021-01-20 PROCEDURE — 99222 1ST HOSP IP/OBS MODERATE 55: CPT | Performed by: PSYCHIATRY & NEUROLOGY

## 2021-01-20 PROCEDURE — 85730 THROMBOPLASTIN TIME PARTIAL: CPT | Performed by: PHYSICIAN ASSISTANT

## 2021-01-20 RX ORDER — GABAPENTIN 300 MG/1
300 CAPSULE ORAL 3 TIMES DAILY
Status: DISCONTINUED | OUTPATIENT
Start: 2021-01-20 | End: 2021-01-29 | Stop reason: HOSPADM

## 2021-01-20 RX ORDER — LORAZEPAM 1 MG/1
2 TABLET ORAL EVERY 8 HOURS PRN
Status: DISCONTINUED | OUTPATIENT
Start: 2021-01-20 | End: 2021-01-29 | Stop reason: HOSPADM

## 2021-01-20 RX ORDER — ESCITALOPRAM OXALATE 5 MG/1
5 TABLET ORAL DAILY
Status: DISCONTINUED | OUTPATIENT
Start: 2021-01-20 | End: 2021-01-25

## 2021-01-20 RX ADMIN — TRAZODONE HYDROCHLORIDE 50 MG: 50 TABLET ORAL at 21:36

## 2021-01-20 RX ADMIN — FOLIC ACID 1 MG: 1 TABLET ORAL at 08:57

## 2021-01-20 RX ADMIN — THIAMINE HCL TAB 100 MG 100 MG: 100 TAB at 08:57

## 2021-01-20 RX ADMIN — Medication 1 TABLET: at 08:57

## 2021-01-20 RX ADMIN — GABAPENTIN 300 MG: 300 CAPSULE ORAL at 21:36

## 2021-01-20 RX ADMIN — NICOTINE 21 MG: 21 PATCH, EXTENDED RELEASE TRANSDERMAL at 21:37

## 2021-01-20 RX ADMIN — LIDOCAINE 1 PATCH: 50 PATCH TOPICAL at 08:57

## 2021-01-20 RX ADMIN — ESCITALOPRAM 5 MG: 5 TABLET, FILM COATED ORAL at 14:34

## 2021-01-20 RX ADMIN — GABAPENTIN 300 MG: 300 CAPSULE ORAL at 16:31

## 2021-01-20 NOTE — PROGRESS NOTES
Pt  Belongings to Room:  Pants  Socks  Thermal Bottoms  Shirt    Pt  Belongings to Storage:  Boots  Coat  Belt    Pt   Belongings to Safe:  Wallet  $20  [facility]  Debit  Credit  JADA FISHER

## 2021-01-20 NOTE — ASSESSMENT & PLAN NOTE
· Patient was history of carpal tunnel release reports pain and burning sensation    · Will add Lidoderm patch  · Follow-up Orthopedics as an outpatient

## 2021-01-20 NOTE — PLAN OF CARE
Problem: Risk for Self Injury/Neglect  Goal: Treatment Goal: Remain safe during length of stay, learn and adopt new coping skills, and be free of self-injurious ideation, impulses and acts at the time of discharge  Outcome: Progressing  Goal: Refrain from harming self  Description: Interventions:  - Monitor patient closely, per order  - Develop a trusting relationship  - Supervise medication ingestion, monitor effects and side effects   Outcome: Progressing  Goal: Recognize maladaptive responses and adopt new coping mechanisms  Outcome: Progressing     Problem: Depression  Goal: Treatment Goal: Demonstrate behavioral control of depressive symptoms, verbalize feelings of improved mood/affect, and adopt new coping skills prior to discharge  Outcome: Progressing  Goal: Verbalize thoughts and feelings  Description: Interventions:  - Assess and re-assess patient's level of risk   - Engage patient in 1:1 interactions, daily, for a minimum of 15 minutes   - Encourage patient to express feelings, fears, frustrations, hopes   Outcome: Progressing  Goal: Refrain from isolation  Description: Interventions:  - Develop a trusting relationship   - Encourage socialization   Outcome: Progressing  Goal: Complete daily ADLs, including personal hygiene independently, as able  Description: Interventions:  - Observe, teach, and assist patient with ADLS  -  Monitor and promote a balance of rest/activity, with adequate nutrition and elimination   Outcome: Progressing     Problem: Anxiety  Goal: Anxiety is at manageable level  Description: Interventions:  - Assess and monitor patient's anxiety level  - Monitor for signs and symptoms (heart palpitations, chest pain, shortness of breath, headaches, nausea, feeling jumpy, restlessness, irritable, apprehensive)  - Collaborate with interdisciplinary team and initiate plan and interventions as ordered    - Merr[place] patient to unit/surroundings  - Explain treatment plan  - Encourage participation in care  - Encourage verbalization of concerns/fears  - Identify coping mechanisms  - Assist in developing anxiety-reducing skills  - Administer/offer alternative therapies  - Limit or eliminate stimulants  Outcome: Progressing

## 2021-01-20 NOTE — TREATMENT PLAN
Treatment Plan [address]  60 y o  male MRN: [id number]    [facility] 633-73 Encounter: [id number]          Admit Date/Time:  1/19/2021  8:40 PM    Treatment Team: Attending Provider: Ramesh Ace MD; Certified Nursing Assistant: Donnie Yan; Patient Care Assistant: Tiff Singleton; Patient Care Assistant: Dedrick Graham; Recreational Therapist: Radha Webber; Care Manager: Willie Gerber RN; :  Garland Belle; Registered Nurse: Ori Olivas RN; Medical Student: Alvin Nesbitt    Diagnosis: Principal Problem:    MDD (major depressive disorder), recurrent episode, moderate ([facility] [address] )  Active Problems:    Medical clearance for psychiatric admission    Alcohol abuse    Right wrist pain    Nicotine abuse    Al[facility] use [facility], moderate, dependence [facility]      Patient Strengths: active sense of humor, special hobby/interest, work skills     Patient Barriers: substance abuse    Progress Toward Goals: ongoing    Recommended Treatment: discharge planning     Treatment Frequency: 1-2 times per week     Expected Discharge Date:     Discharge Plan: referrals as indicated     Treatment Plan Created/Updated By: Ramesh Ace MD

## 2021-01-20 NOTE — ED NOTES
Crisis spoke to Verito Sandra with [facility] at [phone]  She confirmed the fax was received yesterday but she could not reach, Yudith Schuster, the assigned care manager for the case  She stated she will have Yudith Schuster call Crisis once she can reach her    Crisis to f/u

## 2021-01-20 NOTE — PROGRESS NOTES
01/20/21 1542   Team Meeting   Meeting Type Tx Team Meeting   Initial Conference Date 01/20/21   Next Conference Date 02/18/21   Team Members Present   Team Members Present Physician;Nurse;   Physician Team Member Dr Mirza Brice Team Member Robe Foster, RN   Social Work Team Member Emilia Palmer   Patient/Family Present   Patient Present Yes   Patient's Family Present No     Pt presented as quiet, cooperative, having expressed agreement with goals and signed [address]

## 2021-01-20 NOTE — PROGRESS NOTES
Patient bright, alert for meals, back to bed after eating  Pt is calm, cooperative, smiling, pleasant  Denies si hi and hallucinations  Depression 4/10 (only related to hand pain and disfunction) per patient   CIWA =2 this shift  Pt denies feeling withdraw from alcohol at this time  Pt is very [place] and has difficulty understanding staff  Pt did tell this nurse the provider is discharging him to home today  It was determined to be a misunderstanding related to patient's hearing impairment  Pt was made aware he is not being discharged today and is agreeable to stay for treatment until discharged  Will maintain on safety precautions and continual monitoring  Pt is eating but declines groups despite education

## 2021-01-20 NOTE — PROGRESS NOTES
No signs of ETOH withdrawal or DT's overnight   No respiratory issues or change in mental status  Sleep has been sound  No suicidal ideations, or acting out noted  Maintained on q 7 minute checks
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
## 2021-01-20 NOTE — PROGRESS NOTES
Patient given Risperdal 2 mg at 2145 for mild agitation  Agitation Behavior Scale - 23  Feels like his "mind is racing"  No effect noted  CIWA -16  CIWA protocol ordered  Seen by Hospitalist Hill Duong PA-C  Given Motrin 600 mg at 2314 for complaint of #5 right wrist pain  Lidocaine 5 % patch applied to right wrist at this time  Nicotine Patch 21 % placed on right deltoid

## 2021-01-20 NOTE — CONSULTS
Consult- Julia Campuzano 1965, 60 y o  male MRN: [id number]    Unit/Bed#: [facility] 251-02 Encounter: [id number]    Primary Care Provider: No primary care provider on file  Date and time admitted to hospital: 1/19/2021  8:40 PM      Inpatient consult for Medical Clearance for [facility] patient  Consult performed by: Gaurav Fuentes PA-C  Consult ordered by: Christina Gongora MD          Medical clearance for psychiatric admission  Assessment & Plan  · Patient was medically cleared for inpatient behavior health admission  · Patient remains medically cleared for inpatient behavior health admission  · Patient was placed under 302 for treatment    Alcohol abuse  Assessment & Plan  · ciwa protocol    Nicotine abuse  Assessment & Plan  · Patient smokes a pack and half a day  · Will order nicotine patch    Right wrist pain  Assessment & Plan  · Patient was history of carpal tunnel release reports pain and burning sensation  · Will add Lidoderm patch  · Follow-up Orthopedics as an outpatient        Recommendations for Discharge:  · Per Primary Service    History of Present Illness:  Julia Campuzano is a 60 y o  male who is originally evaluated at [facility] emergency room secondary to suicidal ideation  Patient's mother placed the 36 petition  She reports over the last several days the patient had been making comments her text messages to his family that he cannot take it anymore and plan to go off into the woods and and end it patient reports he has a lot of pain and burning in his wrist since the carpal tunnel surgery  He is not able to work as a   He has history of alcohol abuse and tobacco abuse  We are consulted for medical clearance  Patient was medically cleared in the emergency room and remains medically cleared for inpatient behavior health admission  Patient was placed on her 302 for treatment  Lidoderm patch and nicotine patch have been ordered for the patient    Will see him as needed please call if there is any issues or concerns    Review of Systems:  Review of Systems   Musculoskeletal: Positive for arthralgias  Psychiatric/Behavioral: Positive for suicidal ideas  All other systems reviewed and are negative  Past Medical and Surgical History:   History reviewed  No pertinent past medical history  Past Surgical History:   Procedure Laterality Date    CARPAL TUNNEL RELEASE Right 12/2020       Meds/Allergies:  all medications and allergies reviewed    Allergies: No Known Allergies    Social History:     Marital Status:     Substance Use History:   Social History     Substance and Sexual Activity   Alcohol Use Yes    Frequency: 4 or more times a week    Drinks per session: 10 or more    Comment: rufino rosas drinking daily about a "handle" of vodka straight      Social History     Tobacco Use   Smoking Status Current Every Day Smoker    Packs/day: 1 50     Social History     Substance and Sexual Activity   Drug Use Not Currently       Family History:  I have reviewed the patients family history    Physical Exam:   Vitals:   Blood Pressure: 149/76 (01/19/21 2100)  Pulse: (!) 133 (01/19/21 2100)  Temperature: 98 4 °F (36 9 °C) (01/19/21 2100)  Respirations: 18 (01/19/21 2100)    Physical Exam  Vitals signs reviewed  Constitutional:       General: He is not in acute distress  Appearance: Normal appearance  HENT:      Head: Normocephalic and atraumatic  Right Ear: External ear normal       Left Ear: External ear normal       Nose: Nose normal    Eyes:      General:         Right eye: No discharge  Left eye: No discharge  Conjunctiva/sclera: Conjunctivae normal    Neck:      Musculoskeletal: Normal range of motion  Cardiovascular:      Rate and Rhythm: Regular rhythm  Tachycardia present  Heart sounds: Normal heart sounds  No murmur  Pulmonary:      Effort: Pulmonary effort is normal  No respiratory distress        Breath sounds: Normal breath sounds  No wheezing or rales  Abdominal:      General: Bowel sounds are normal  There is no distension  Palpations: Abdomen is soft  Tenderness: There is no abdominal tenderness  There is no guarding  Musculoskeletal: Normal range of motion  Skin:     General: Skin is warm and dry  Neurological:      Mental Status: He is alert and oriented to person, place, and time  Mental status is at baseline  Cranial Nerves: No cranial nerve deficit  Psychiatric:         Mood and Affect: Mood normal          Behavior: Behavior normal          Additional Data:   Lab Results: I have personally reviewed pertinent reports  Results from last 7 days   Lab Units 01/18/21  1225   WBC Thousand/uL 2 89*   HEMOGLOBIN g/dL 14 9   HEMATOCRIT % 42 2   PLATELETS Thousands/uL 94*   NEUTROS PCT % 65   LYMPHS PCT % 19   MONOS PCT % 13*   EOS PCT % 1     Results from last 7 days   Lab Units 01/18/21  1316   SODIUM mmol/L 140   POTASSIUM mmol/L 4 1   CHLORIDE mmol/L 101   CO2 mmol/L 26   BUN mg/dL 13   CREATININE mg/dL 0 73   CALCIUM mg/dL 9 3       ** Please Note: This note has been constructed using a voice recognition system   **

## 2021-01-20 NOTE — H&P
Psychiatric Evaluation - Lindsey 60 y o  male MRN: [id number]  Unit/Bed#: [facility] 251-02 Encounter: [id number]    Assessment/Plan   Principal Problem:    MDD (major depressive disorder), recurrent episode, moderate (HCC)  Active Problems:    Medical clearance for psychiatric admission    Alcohol abuse    Right wrist pain    Nicotine abuse    Alcohol use disorder, moderate, dependence ([facility] [address] )    Plan:   Risks, benefits and possible side effects of Medications:   Risks, benefits, and possible side effects of medications explained to patient and patient verbalizes understanding  1  Admit to acute psychiatric level of care for safety and stability  2  Start Lexapro for depressive symptoms and gabapentin to help decrease cravings for alcohol  Continue CIWA protocol  3  Individual, group, and milieu therapy  4  Safety and discharge planning    Chief Complaint: "I don't want to go on living like this"    History of Present Illness     Patient is a 60 y o  male presents with worsening depression and suicidal behavior by over drinking vodka  Patient reports that he has been getting increasingly depressed over the past few months after he was not able to work  Patient reports that he got surgery for carpal tunnel and he used to work as a  and is not able to do that anymore  Patient reports that he was coping by drinking a lot of vodka and was having thoughts that he is better off being dead  Patient was brought to the emergency room on a 302 petition by his family  Patient does reside with his mother who is his main support system  Patient denies using any drugs  He denies any previous psychiatric hospitalizations  Patient denies treatment in any drug and alcohol rehab in the past and seems to minimize his alcohol use        Psychiatric Review Of Systems:  sleep: yes  appetite changes: no  weight changes: yes  energy/anergy: yes  interest/pleasure/anhedonia: no  somatic symptoms: yes  anxiety/panic: no  guillermina: no  guilty/hopeless: no  self injurious behavior/risky behavior: yes    Historical Information     Past Psychiatric History:   Dual drug/alcohol  Substance Abuse History:  E-Cigarette/Vaping    E-Cigarette Use Never User       E-Cigarette/Vaping Substances    Nicotine No     THC No     CBD No     Flavoring No     Other No     Unknown No        Social History     Tobacco History     Smoking Status  Current Every Day Smoker Smoking Frequency  1 pack/day    Smokeless Tobacco Use  Never Used          Alcohol History     Alcohol Use Status  Yes Comment  patinet reprots drinking daily about a "handle" of vodka straight           Drug Use     Drug Use Status  Not Currently          Sexual Activity     Sexually Active  Not Currently          Activities of Daily Living    Not Asked               Additional Substance Use Detail     Questions Responses    Problems Due to Past Use of Alcohol?  Yes    Alcohol Use Frequency Daily    Alcohol Drink of Choice volka    Last Use of Alcohol & Amount 2 days ago    Longest Abstinence from Alcohol unknown    Last reviewed by Tawny Hdz RN on 1/19/2021        I have assessed this patient for substance use within the past 12 months    Family Psychiatric History:   Psychiatric Illness Mother  Illness: ongoing    Social History:  Education: high school diploma/GED  Marital history: single      Past Medical History:   Diagnosis Date    Substance abuse ([facility] [address] )        Medical Review Of Systems:  A comprehensive review of systems was negative except for: Ears, nose, mouth, throat, and face: positive for hearing loss    Meds/Allergies   all current active meds have been reviewed  No Known Allergies    Objective   Vital signs in last 24 hours:  Temp:  [97 5 °F (36 4 °C)-98 6 °F (37 °C)] 97 5 °F (36 4 °C)  HR:  [] 105  Resp:  [18-19] 18  BP: ()/() 99/70    No intake or output data in the 24 hours ending 01/20/21 1354    Mental Status Evaluation:  Appearance:  disheveled and older than stated age   Behavior:  evasive   Speech:  loud   Mood:  anxious   Affect:  increased in intensity   Language: repeating phrases   Thought Process:  circumstantial   Thought Content:  normal   Perceptual Disturbances: None   Risk Potential: Suicidal Ideations without plan  Homicidal Ideations none  Potential for Aggression No   Sensorium:  person and place   Memory:  recent and remote memory grossly intact   Consciousness:  awake    Attention: attention span appeared shorter than expected for age   Intellect: not examined   Fund of Knowledge: awareness of current events: fair   Insight:  limited   Judgment: limited   Muscle Strength and Tone: face and neck   Gait/Station: normal gait/station   Motor Activity: no abnormal movements     Lab Results: I have personally reviewed all pertinent laboratory/tests results  Patient Strengths/Assets: ability for insight    Patient Barriers/Limitations: financial instability    Counseling / Coordination of Care  Total floor / unit time spent today 25 minutes  Greater than 50% of total time was spent with the patient and / or family counseling and / or coordination of care   A description of the counseling / coordination of care:
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
## 2021-01-20 NOTE — PROGRESS NOTES
01/20/21 1000   Activity/Group Checklist   Group   (Critical Thinking Art Therapy Expression)   Attendance Did not attend  (AT group offered, pt elected to remain in room)

## 2021-01-20 NOTE — NURSING NOTE
Patient admitted to [facility], [address] [place] [address] from [facility] with a valid 201  Physical assessment completed, no wounds or weapons noted  Refused shower (had shower prior to admission), no signs of infestation noted  Bilateral hand tremors noted  Bill of Rights and HIPPA regulations reviewed and signed  Admission medication and diet ordered  Oriented to unit  Started on Q 7 minute safety checks  Fluids given  States his appetite has been fair  Ongoing poor sleep was reported  Stated he is not suicidal at present  Admits to drinking 1/5 of [place] daily  Also informed RN that he has had DT's in the past   Hospitalist Pedro texted  Personal property recording started by MHT's  Affect flat  Appears depressed  Complaining of anxiety and mild agitation  Rates depression a 3/10

## 2021-01-20 NOTE — ED NOTES
Insurance Authorization for admission:   Phone call placed to Carli Bone  Phone number: [phone]  Spoke to Mavis Levine  3 days approved  LCD 1/21/21  Level of care: Inpatient  Review on 1/22/21     Authorization # [id number]    UR to call Mavis Levine at [phone] on 1/22 for concurrent review

## 2021-01-20 NOTE — ASSESSMENT & PLAN NOTE
· Patient was medically cleared for inpatient behavior health admission  · Patient remains medically cleared for inpatient behavior health admission  · Patient was placed under 302 for treatment

## 2021-01-20 NOTE — PROGRESS NOTES
01/20/21 0730   Activity/Group Checklist   Group   (Goal Planning and Communication)   Attendance Attended   Attendance Duration (min) 46-60   Interactions Interacted appropriately   Affect/Mood Appropriate;Calm   Goals Achieved Discussed coping strategies; Able to listen to others; Able to engage in interactions

## 2021-01-20 NOTE — PROGRESS NOTES
Patient upset withdrawing from ETOH  Stated he had DT's in the past   Increased hand tremors noted  Anxiety very high at this time  Pulse 142  De Scale 29  Given 2 mg IM Ativan in left deltoid at 2345 for severe anxiety  Medication was effective to decrease anxiety and withdrawal symptom as of 2359  CIWA at this time 6  Will continue to monitor

## 2021-01-21 PROCEDURE — 99232 SBSQ HOSP IP/OBS MODERATE 35: CPT | Performed by: NURSE PRACTITIONER

## 2021-01-21 RX ADMIN — THIAMINE HCL TAB 100 MG 100 MG: 100 TAB at 08:35

## 2021-01-21 RX ADMIN — GABAPENTIN 300 MG: 300 CAPSULE ORAL at 08:36

## 2021-01-21 RX ADMIN — GABAPENTIN 300 MG: 300 CAPSULE ORAL at 21:51

## 2021-01-21 RX ADMIN — FOLIC ACID 1 MG: 1 TABLET ORAL at 08:36

## 2021-01-21 RX ADMIN — TRAZODONE HYDROCHLORIDE 50 MG: 50 TABLET ORAL at 21:52

## 2021-01-21 RX ADMIN — LIDOCAINE 1 PATCH: 50 PATCH TOPICAL at 08:36

## 2021-01-21 RX ADMIN — GABAPENTIN 300 MG: 300 CAPSULE ORAL at 16:24

## 2021-01-21 RX ADMIN — NICOTINE 21 MG: 21 PATCH, EXTENDED RELEASE TRANSDERMAL at 21:53

## 2021-01-21 RX ADMIN — Medication 1 TABLET: at 08:35

## 2021-01-21 RX ADMIN — ESCITALOPRAM 5 MG: 5 TABLET, FILM COATED ORAL at 08:35

## 2021-01-21 NOTE — PLAN OF CARE
Problem: Risk for Self Injury/Neglect  Goal: Treatment Goal: Remain safe during length of stay, learn and adopt new coping skills, and be free of self-injurious ideation, impulses and acts at the time of discharge  Outcome: Progressing  Goal: Refrain from harming self  Description: Interventions:  - Monitor patient closely, per order  - Develop a trusting relationship  - Supervise medication ingestion, monitor effects and side effects   Outcome: Progressing  Goal: Recognize maladaptive responses and adopt new coping mechanisms  Outcome: Progressing     Problem: Depression  Goal: Treatment Goal: Demonstrate behavioral control of depressive symptoms, verbalize feelings of improved mood/affect, and adopt new coping skills prior to discharge  Outcome: Progressing  Goal: Verbalize thoughts and feelings  Description: Interventions:  - Assess and re-assess patient's level of risk   - Engage patient in 1:1 interactions, daily, for a minimum of 15 minutes   - Encourage patient to express feelings, fears, frustrations, hopes   Outcome: Progressing  Goal: Refrain from isolation  Description: Interventions:  - Develop a trusting relationship   - Encourage socialization   Outcome: Progressing  Goal: Complete daily ADLs, including personal hygiene independently, as able  Description: Interventions:  - Observe, teach, and assist patient with ADLS  -  Monitor and promote a balance of rest/activity, with adequate nutrition and elimination   Outcome: Progressing     Problem: Anxiety  Goal: Anxiety is at manageable level  Description: Interventions:  - Assess and monitor patient's anxiety level  - Monitor for signs and symptoms (heart palpitations, chest pain, shortness of breath, headaches, nausea, feeling jumpy, restlessness, irritable, apprehensive)  - Collaborate with interdisciplinary team and initiate plan and interventions as ordered    - [place] patient to unit/surroundings  - Explain treatment plan  - Encourage participation in care  - Encourage verbalization of concerns/fears  - Identify coping mechanisms  - Assist in developing anxiety-reducing skills  - Administer/offer alternative therapies  - Limit or eliminate stimulants  Outcome: Progressing     Problem: Ineffective Coping  Goal: Participates in unit activities  Description: Interventions:  - Provide therapeutic environment   - Provide required programming   - Redirect inappropriate behaviors   Outcome: Progressing     Problem: SUBSTANCE USE/ABUSE  Goal: By discharge, will develop insight into their chemical dependency and sustain motivation to continue in recovery  Description: INTERVENTIONS:  - Attends all daily group sessions and scheduled AA groups  - Actively practices coping skills through participation in the therapeutic community and adherence to program rules  - Reviews and completes assignments from individual treatment plan  - Assist patient development of understanding of their personal cycle of addiction and relapse triggers  Outcome: Progressing  Goal: By discharge, patient will have ongoing treatment plan addressing chemical dependency  Description: INTERVENTIONS:  - Assist patient with resources and/or appointments for ongoing recovery based living  Outcome: Progressing     Problem: DISCHARGE PLANNING - CARE MANAGEMENT  Goal: Discharge to post-acute care or home with appropriate resources  Description: INTERVENTIONS:  - Conduct assessment to determine patient/family and health care team treatment goals, and need for post-acute services based on payer coverage, community resources, and patient preferences, and barriers to discharge  - Address psychosocial, clinical, and financial barriers to discharge as identified in assessment in conjunction with the patient/family and health care team  - Arrange appropriate level of post-acute services according to patients   needs and preference and payer coverage in collaboration with the physician and health care team  - Communicate with and update the patient/family, physician, and health care team regarding progress on the discharge plan  - Arrange appropriate transportation to post-acute venues  Outcome: Progressing     Problem: Nutrition/Hydration-ADULT  Goal: Nutrient/Hydration intake appropriate for improving, restoring or maintaining nutritional needs  Description: Monitor and assess patient's nutrition/hydration status for malnutrition  Collaborate with interdisciplinary team and initiate plan and interventions as ordered  Monitor patient's weight and dietary intake as ordered or per policy  Utilize nutrition screening tool and intervene as necessary  Determine patient's food preferences and provide high-protein, high-caloric foods as appropriate       INTERVENTIONS:  - Monitor oral intake, urinary output, labs, and treatment plans  - Assess nutrition and hydration status and recommend course of action  - Evaluate amount of meals eaten  - Assist patient with eating if necessary   - Allow adequate time for meals  - Recommend/ encourage appropriate diets, oral nutritional supplements, and vitamin/mineral supplements  - Order, calculate, and assess calorie counts as needed  - Recommend, monitor, and adjust tube feedings and TPN/PPN based on assessed needs  - Assess need for intravenous fluids  - Provide specific nutrition/hydration education as appropriate  - Include patient/family/caregiver in decisions related to nutrition  Outcome: Progressing

## 2021-01-21 NOTE — PROGRESS NOTES
01/21/21 1000   Activity/Group Checklist   Group   (Effective Communication and Art Therapy Processing)   Attendance Attended   Attendance Duration (min) Greater than 60   Interactions Interacted appropriately   Affect/Mood Appropriate;Bright;Calm   Goals Achieved Identified feelings; Discussed coping strategies; Able to listen to others; Able to engage in interactions; Able to reflect/comment on own behavior;Able to manage/cope with feelings;Verbalized increased hopefulness

## 2021-01-21 NOTE — PROGRESS NOTES
Patient is alert, oriented, pleasant and cooperative  Denies any S/H ideation  Patient also denies any symptoms of withdrawal, none observed  Patient expressed decreased pain with the use of the lidocaine patch  Patch was removed as per order  Nicotine patch was removed and a new one was applied to right shoulder area  Will continue to monitor during 7 minute safety checks

## 2021-01-21 NOTE — PROGRESS NOTES
Patient has been sleeping throughout the night  No signs or symptoms of pain, discomfort or respiratory distress  Will continue to observe and monitor during 7 minute safety checks

## 2021-01-21 NOTE — PROGRESS NOTES
01/21/21 09:32   Team Meeting   Meeting Type Daily Rounds   Initial Conference Date 01/21/21   Team Members Present   Team Members Present Physician;Nurse;   Physician Team Member Dr Karen Pierre; MATEUS Wisdom   Nursing Team Member Jessica Rodriguez RN   Social Work Team Member Sarah Cristobal, [place]   Patient/Family Present   Patient Present No   Patient's Family Present No     Appreciative for Lidoderm patch  Hearing impairment  Needs more labs

## 2021-01-21 NOTE — PROGRESS NOTES
Progress Note - [address] 60 y o  male MRN: [id number]   Unit/Bed#: Deepa Roman 929-60 Encounter: [id number]    Behavior over the last 24 hours:      Jamar Quezada was seen for an inpatient, follow up psychiatric unit  He relates he is feeling well  He denies any suicidal or homicidal ideation  He is taking his medications as ordered and denies any side effects  His appetite and sleep are within normal limits  He offers no complaints at this time  His mood an behavior are controlled on the unit  ROS: no complaints, all other systems are negative    Mental Status Evaluation:    Appearance:  casually dressed   Behavior:  cooperative, calm   Speech:  normal rate and volume   Mood:  improved   Affect:  reactive   Thought Process:  organized, logical, coherent   Associations: intact associations   Thought Content:  normal, no overt delusions   Perceptual Disturbances: none   Risk Potential: Suicidal ideation - None  Homicidal ideation - None  Potential for aggression - No   Sensorium:  oriented to person, place and time/date   Memory:  recent and remote memory grossly intact   Consciousness:  alert and awake   Attention: decreased concentration and decreased attention span   Insight:  fair   Judgment: fair   Gait/Station: normal gait/station, normal balance   Motor Activity: no abnormal movements     Vital signs in last 24 hours:    Temp:  [98 2 °F (36 8 °C)] 98 2 °F (36 8 °C)  HR:  [] 86  Resp:  [16-18] 18  BP: ()/(66-92) 123/92    Laboratory results:  I have personally reviewed all pertinent laboratory/tests results      Progress Toward Goals: progressing    Assessment/Plan   Principal Problem:    MDD (major depressive disorder), recurrent episode, moderate (HCC)  Active Problems:    Medical clearance for psychiatric admission    Alcohol abuse    Right wrist pain    Nicotine abuse    Alcohol use disorder, moderate, dependence ([facility] )    Recommended Treatment:     Continue current medications as prescribed  Continue to monitor  Discharge disposition and planning are ongoing  All current active medications have been reviewed  Encourage group therapy, milieu therapy and occupational therapy  Behavioral Health checks every 7 minutes    Current Facility-Administered Medications   Medication Dose Route Frequency Provider Last Rate    acetaminophen  650 mg Oral Q6H PRN Elaina Feliz MD      escitalopram  5 mg Oral Daily Elaina Feliz MD      folic acid  1 mg Oral Daily Krzysztof Williamson PA-C      gabapentin  300 mg Oral TID Elaina Feliz MD      haloperidol lactate  5 mg Intramuscular Q6H PRN Elaina Feliz MD      hydrOXYzine HCL  50 mg Oral Q4H PRN Elaina Feliz MD      ibuprofen  600 mg Oral Q8H PRN Elaina Feliz MD      lidocaine  1 patch Topical Daily Krzysztof Williamson PA-C      LORazepam  2 mg Intramuscular Q4H PRN Elaina Feliz MD      LORazepam  2 mg Oral Q8H PRN Elaina Feliz MD      multivitamin-minerals  1 tablet Oral Daily Krzysztof Williamson PA-C      nicotine  21 mg Transdermal HS Krzysztof Williamson PA-C      pneumococcal 23-valent polysaccharide vaccine  0 5 mL Subcutaneous Prior to discharge Elaina Feliz MD      risperiDONE  2 mg Oral Q3H PRN Elaina Feliz MD      thiamine  100 mg Oral Daily Krzysztof Williamson PA-C      traZODone  50 mg Oral HS PRN Elaina Feliz MD         Risks / Benefits of Treatment:    Risks, benefits, and possible side effects of medications explained to patient and patient verbalizes understanding and agreement for treatment  Counseling / Coordination of Care:      Patient's progress discussed with staff in treatment team meeting  Medications, treatment progress and treatment plan reviewed with patient

## 2021-01-21 NOTE — PROGRESS NOTES
AT met with PT to complete self assessment and relapse prevention plan  PT was cooperative, displayed an appropriate mood and affect throughout and maintained good eye contact  PT reported that he is hard of hearing and stated that he does read lips and can hear a little clearer in his right ear  PT identified his biggest stressor leading to this admission as a failed carpal tunnel surgery and that this affects his ability to do the things that are important to him all of the time  What pt likes most about life is shooting, hunting, bike riding, and motorcycle riding and what he likes least about life is his present mood and people that suck  PT completed high school and stated that he does currently have a job, however that he has not worked for about two months now as a   PT enjoys reading, playing cards, music and gardening  PT would like to work on coping with the symptoms of his illness and helping his family to understand his illness while he is here in the hospital and stated that he will know that he is ready for discharge that he is feeling ready now  PT also completed the RP form where he identified his warning signs to reach out for help as his physical pain being very strong and depression  PT identified his brother, his mother and a close friend of his, Gene as his positive supports in the community and shooting and riding as his healthy coping skills of choice  AT will continue to follow and encourage group participation

## 2021-01-21 NOTE — PROGRESS NOTES
01/21/21 0726   Activity/Group Checklist   Group   (Goal Planning and Communication)   Attendance Attended   Attendance Duration (min) 46-60   Interactions Interacted appropriately   Affect/Mood Appropriate;Bright;Calm   Goals Achieved Identified feelings; Discussed coping strategies; Able to listen to others; Able to engage in interactions

## 2021-01-21 NOTE — PROGRESS NOTES
Pt has been visible on unit, social with peers and attending groups  Very pleasant and cooperative  Pleased with the pain relief that lidocaine patch has been able to provide  Denies current SI, HI, A/T/V  Compliant with meds and meals  Monitoring continues

## 2021-01-22 PROBLEM — E44.0 MODERATE PROTEIN-CALORIE MALNUTRITION (HCC): Status: ACTIVE | Noted: 2021-01-22

## 2021-01-22 PROCEDURE — 99232 SBSQ HOSP IP/OBS MODERATE 35: CPT | Performed by: NURSE PRACTITIONER

## 2021-01-22 RX ORDER — DIPHENHYDRAMINE HCL 25 MG
25 TABLET ORAL EVERY 6 HOURS PRN
Status: DISCONTINUED | OUTPATIENT
Start: 2021-01-22 | End: 2021-01-29 | Stop reason: HOSPADM

## 2021-01-22 RX ADMIN — GABAPENTIN 300 MG: 300 CAPSULE ORAL at 21:36

## 2021-01-22 RX ADMIN — GABAPENTIN 300 MG: 300 CAPSULE ORAL at 08:50

## 2021-01-22 RX ADMIN — THIAMINE HCL TAB 100 MG 100 MG: 100 TAB at 08:50

## 2021-01-22 RX ADMIN — DIPHENHYDRAMINE HCL 25 MG: 25 TABLET ORAL at 16:01

## 2021-01-22 RX ADMIN — Medication 1 TABLET: at 08:50

## 2021-01-22 RX ADMIN — NICOTINE 21 MG: 21 PATCH, EXTENDED RELEASE TRANSDERMAL at 21:35

## 2021-01-22 RX ADMIN — FOLIC ACID 1 MG: 1 TABLET ORAL at 08:50

## 2021-01-22 RX ADMIN — ESCITALOPRAM 5 MG: 5 TABLET, FILM COATED ORAL at 08:50

## 2021-01-22 RX ADMIN — GABAPENTIN 300 MG: 300 CAPSULE ORAL at 16:01

## 2021-01-22 RX ADMIN — LIDOCAINE 1 PATCH: 50 PATCH TOPICAL at 08:49

## 2021-01-22 RX ADMIN — TRAZODONE HYDROCHLORIDE 50 MG: 50 TABLET ORAL at 21:36

## 2021-01-22 NOTE — PLAN OF CARE
Problem: Risk for Self Injury/Neglect  Goal: Treatment Goal: Remain safe during length of stay, learn and adopt new coping skills, and be free of self-injurious ideation, impulses and acts at the time of discharge  Outcome: Progressing  Goal: Refrain from harming self  Description: Interventions:  - Monitor patient closely, per order  - Develop a trusting relationship  - Supervise medication ingestion, monitor effects and side effects   Outcome: Progressing  Goal: Recognize maladaptive responses and adopt new coping mechanisms  Outcome: Progressing     Problem: Depression  Goal: Treatment Goal: Demonstrate behavioral control of depressive symptoms, verbalize feelings of improved mood/affect, and adopt new coping skills prior to discharge  Outcome: Progressing  Goal: Verbalize thoughts and feelings  Description: Interventions:  - Assess and re-assess patient's level of risk   - Engage patient in 1:1 interactions, daily, for a minimum of 15 minutes   - Encourage patient to express feelings, fears, frustrations, hopes   Outcome: Progressing  Goal: Refrain from isolation  Description: Interventions:  - Develop a trusting relationship   - Encourage socialization   Outcome: Progressing  Goal: Complete daily ADLs, including personal hygiene independently, as able  Description: Interventions:  - Observe, teach, and assist patient with ADLS  -  Monitor and promote a balance of rest/activity, with adequate nutrition and elimination   Outcome: Progressing     Problem: Anxiety  Goal: Anxiety is at manageable level  Description: Interventions:  - Assess and monitor patient's anxiety level  - Monitor for signs and symptoms (heart palpitations, chest pain, shortness of breath, headaches, nausea, feeling jumpy, restlessness, irritable, apprehensive)  - Collaborate with interdisciplinary team and initiate plan and interventions as ordered    - [place] patient to unit/surroundings  - Explain treatment plan  - Encourage participation in care  - Encourage verbalization of concerns/fears  - Identify coping mechanisms  - Assist in developing anxiety-reducing skills  - Administer/offer alternative therapies  - Limit or eliminate stimulants  Outcome: Progressing     Problem: Ineffective Coping  Goal: Participates in unit activities  Description: Interventions:  - Provide therapeutic environment   - Provide required programming   - Redirect inappropriate behaviors   Outcome: Progressing     Problem: SUBSTANCE USE/ABUSE  Goal: By discharge, will develop insight into their chemical dependency and sustain motivation to continue in recovery  Description: INTERVENTIONS:  - Attends all daily group sessions and scheduled AA groups  - Actively practices coping skills through participation in the therapeutic community and adherence to program rules  - Reviews and completes assignments from individual treatment plan  - Assist patient development of understanding of their personal cycle of addiction and relapse triggers  Outcome: Progressing  Goal: By discharge, patient will have ongoing treatment plan addressing chemical dependency  Description: INTERVENTIONS:  - Assist patient with resources and/or appointments for ongoing recovery based living  Outcome: Progressing     Problem: SUBSTANCE USE/ABUSE  Goal: By discharge, will develop insight into their chemical dependency and sustain motivation to continue in recovery  Description: INTERVENTIONS:  - Attends all daily group sessions and scheduled AA groups  - Actively practices coping skills through participation in the therapeutic community and adherence to program rules  - Reviews and completes assignments from individual treatment plan  - Assist patient development of understanding of their personal cycle of addiction and relapse triggers  Outcome: Progressing  Goal: By discharge, patient will have ongoing treatment plan addressing chemical dependency  Description: INTERVENTIONS:  - Assist patient with resources and/or appointments for ongoing recovery based living  Outcome: Progressing

## 2021-01-22 NOTE — PROGRESS NOTES
Patient received the following items;    6 pairs of socks  Underwear x6  Brown slippers  Black short sleeve shirt x2  Blue short sleeve shirt  Black long sleeve shirt x2  Blue long sleeve shirt  Blue jeans  Black jeans  Black zip up sweater  Blue/white flannel shirt  Red flannel shirt  Reading glasses  Red/green robe with strings  Green plaid sweat pants with string  Dog print sweat pants with string  Black flannel shirt  Red bag with straps  Navy canvas bag with straps  Sneakers with strings  Cell phone with           Went over belongings with patient and patient signed paperwork

## 2021-01-22 NOTE — PROGRESS NOTES
Patient has been sleeping throughout the night  No signs or symptoms of pain, discomfort or respiratory distress  Will continue to monitor for safety during 7 minute checks

## 2021-01-22 NOTE — PROGRESS NOTES
01/22/21 0768   Activity/Group Checklist   Group   (Goal Planning and Communication)   Attendance Attended   Attendance Duration (min) 46-60   Interactions Interacted appropriately   Affect/Mood Appropriate;Bright;Calm   Goals Achieved Identified feelings; Discussed coping strategies; Able to listen to others; Able to engage in interactions; Able to manage/cope with feelings;Verbalized increased hopefulness

## 2021-01-22 NOTE — PROGRESS NOTES
01/22/21 1000   Activity/Group Checklist   Group   (Creative Writing Self Expression/Art Therapy Processing)   Attendance Attended   Attendance Duration (min) 46-60   Interactions Interacted appropriately   Affect/Mood Appropriate;Bright;Calm   Goals Achieved Identified feelings; Discussed coping strategies; Able to listen to others; Able to engage in interactions; Able to manage/cope with feelings;Verbalized increased hopefulness; Able to recieve feedback; Able to give feedback to another

## 2021-01-22 NOTE — PROGRESS NOTES
Patient was visible on unit, social with peers, pleasant, cooperative and compliant with meds  Denies any S/H ideation  Also denies any A/V/T hallucinations  Nicotine patch removed, new one applied  Lidoderm patch removed also  Will continue to monitor for safety during 7 minute safety checks

## 2021-01-22 NOTE — PROGRESS NOTES
01/22/21 0800   Team Meeting   Meeting Type Daily Rounds   Initial Conference Date 01/22/21   Team Members Present   Team Members Present Physician;Nurse;   Physician Team Member Dr Solorzano Memos; Himanshu Oden, [facility]   Nursing Team Member Martina Lin, RN   Social Work Team Member Reyna Rm   Patient/Family Present   Patient Present No   Patient's Family Present No     Cooperative with nrsg staff  Has carpal tunnel pain  Would benefit from Lidoderm patches as a medication to use at home  Poor sleep yesterday  Couldn't determine trigger for poor sleep  Potential discharge on Monday

## 2021-01-22 NOTE — PROGRESS NOTES
Pt visible on unit  Social with peers  Mykel Hummel of hearing  Pleasant and cooperative  Pt verbalizes that Lidoderm patch is extremely effective in controlling his pain in right wrist and improves ADL's  Pt denies any signs or symptoms of alcohol withdrawal  Fine tremors observed by RN during assessment  Pt also complains of feeling congestion and running nose  Administered benadryl prn as prescribed  Provided teaching about signs and symptoms of withdrawal  Pt verbalized understanding  Minimizes substance use history  Compliant with all medications as prescribed  Safety precautions maintained  Will continue to monitor and assess

## 2021-01-23 PROCEDURE — 99232 SBSQ HOSP IP/OBS MODERATE 35: CPT | Performed by: NURSE PRACTITIONER

## 2021-01-23 RX ADMIN — FOLIC ACID 1 MG: 1 TABLET ORAL at 09:28

## 2021-01-23 RX ADMIN — NICOTINE 21 MG: 21 PATCH, EXTENDED RELEASE TRANSDERMAL at 22:03

## 2021-01-23 RX ADMIN — GABAPENTIN 300 MG: 300 CAPSULE ORAL at 21:52

## 2021-01-23 RX ADMIN — Medication 1 TABLET: at 09:28

## 2021-01-23 RX ADMIN — GABAPENTIN 300 MG: 300 CAPSULE ORAL at 09:28

## 2021-01-23 RX ADMIN — LIDOCAINE 1 PATCH: 50 PATCH TOPICAL at 09:27

## 2021-01-23 RX ADMIN — GABAPENTIN 300 MG: 300 CAPSULE ORAL at 16:37

## 2021-01-23 RX ADMIN — THIAMINE HCL TAB 100 MG 100 MG: 100 TAB at 09:28

## 2021-01-23 RX ADMIN — ESCITALOPRAM 5 MG: 5 TABLET, FILM COATED ORAL at 09:28

## 2021-01-23 RX ADMIN — TRAZODONE HYDROCHLORIDE 50 MG: 50 TABLET ORAL at 21:57

## 2021-01-23 NOTE — PROGRESS NOTES
Patient was visible on the unit, social with peers, pleasant and cooperative  Pt  Denies any thoughts of self harm  Said he is feeling slightly better  Denies any signs or symptoms of withdrawal, no symptoms noted  Compliant with treatment and medications  Will continue to monitor for safety during 7 minute checks

## 2021-01-23 NOTE — PROGRESS NOTES
Patient slept throughout the night  No signs or symptoms of pain, discomfort or respiratory distress

## 2021-01-23 NOTE — PROGRESS NOTES
Pt was seen on the unit  Pt denies current si/hi at this time  Pt appears bright and social on the unit  Pt denies current medical complaints at this time  Pt has been able to let his needs be known to others

## 2021-01-23 NOTE — PROGRESS NOTES
Progress Note - [address] 60 y o  male MRN: [id number]   Unit/Bed#: [facility] 251-02 Encounter: [id number]    Behavior over the last 24 hours:      Deejay Wolf was seen for an inpatient follow-up psychiatric visit this date  At today's visit, he denies any suicidal homicidal ideation and relates his mood is greatly improved since his 1st day of admission  He is taking his medications as prescribed without side effects  His appetite and sleep are improved  He denies any suicidal or homicidal ideation as well as any symptoms of psychosis  He is not experiencing any alcohol withdrawal symptoms  He is pleasant and cooperative with staff and is social with peers on the unit  He is looking forward to being discharged next week  ROS: no complaints, all other systems are negative    Mental Status Evaluation:    Appearance:  casually dressed, dressed appropriately   Behavior:  pleasant, cooperative, calm   Speech:  normal rate and volume   Mood:  improved   Affect:  normal range and intensity   Thought Process:  organized, logical, coherent   Associations: intact associations   Thought Content:  normal, no overt delusions   Perceptual Disturbances: none   Risk Potential: Suicidal ideation - None  Homicidal ideation - None  Potential for aggression - No   Sensorium:  oriented to person, place and time/date   Memory:  recent and remote memory grossly intact   Consciousness:  alert and awake   Attention: attention span and concentration are age appropriate   Insight:  good   Judgment: good   Gait/Station: normal gait/station, normal balance   Motor Activity: no abnormal movements     Vital signs in last 24 hours:    Temp:  [98 5 °F (36 9 °C)-98 7 °F (37 1 °C)] 98 5 °F (36 9 °C)  HR:  [] 68  Resp:  [18] 18  BP: (120-132)/(80-82) 132/81    Laboratory results:  I have personally reviewed all pertinent laboratory/tests results      Progress Toward Goals: improved    Assessment/Plan   Principal Problem:    MDD (major depressive disorder), recurrent episode, moderate (HCC)  Active Problems:    Medical clearance for psychiatric admission    Alcohol abuse    Right wrist pain    Nicotine abuse    Alcohol use disorder, moderate, dependence (HCC)    Moderate protein-calorie malnutrition ([address] )    Recommended Treatment:     Continue current medications as prescribed  Continue to monitor  Discharge disposition and planning are ongoing      All current active medications have been reviewed  Encourage group therapy, milieu therapy and occupational therapy  Behavioral Health checks every 7 minutes    Current Facility-Administered Medications   Medication Dose Route Frequency Provider Last Rate    acetaminophen  650 mg Oral Q6H PRN Ruslan Burgos, MD      diphenhydrAMINE  25 mg Oral Q6H PRN MATEUS Davila      escitalopram  5 mg Oral Daily Ruslan Burgos MD      folic acid  1 mg Oral Daily Teagan Cates PA-C      gabapentin  300 mg Oral TID Ruslan Burgos MD      haloperidol lactate  5 mg Intramuscular Q6H PRN Ruslan Burgos MD      hydrOXYzine HCL  50 mg Oral Q4H PRN Ruslan Burgos MD      ibuprofen  600 mg Oral Q8H PRN Ruslan Burgos MD      lidocaine  1 patch Topical Daily Teagan Cates PA-C      LORazepam  2 mg Intramuscular Q4H PRN Ruslan Burgos, MD      LORazepam  2 mg Oral Q8H PRN Ruslan Burgos, MD      multivitamin-minerals  1 tablet Oral Daily Teagan Cates PA-C      nicotine  21 mg Transdermal HS Teagan Cates PA-C      pneumococcal 23-valent polysaccharide vaccine  0 5 mL Subcutaneous Prior to discharge Ruslan Burgos MD      risperiDONE  2 mg Oral Q3H PRN Ruslan Burgos MD      thiamine  100 mg Oral Daily Teagan Cates PA-C      traZODone  50 mg Oral HS PRN Ruslan Burgos MD         Risks / Benefits of Treatment:    Risks, benefits, and possible side effects of medications explained to patient and patient verbalizes understanding and agreement for treatment  Counseling / Coordination of Care:      Patient's progress discussed with staff in treatment team meeting  Medications, treatment progress and treatment plan reviewed with patient

## 2021-01-24 PROBLEM — Z00.8 MEDICAL CLEARANCE FOR PSYCHIATRIC ADMISSION: Status: RESOLVED | Noted: 2021-01-19 | Resolved: 2021-01-24

## 2021-01-24 PROBLEM — F10.10 ALCOHOL ABUSE: Status: RESOLVED | Noted: 2021-01-19 | Resolved: 2021-01-24

## 2021-01-24 PROCEDURE — 99232 SBSQ HOSP IP/OBS MODERATE 35: CPT | Performed by: NURSE PRACTITIONER

## 2021-01-24 RX ORDER — ESCITALOPRAM OXALATE 5 MG/1
5 TABLET ORAL DAILY
Qty: 30 TABLET | Refills: 0 | Status: SHIPPED | OUTPATIENT
Start: 2021-01-25 | End: 2021-02-24

## 2021-01-24 RX ORDER — LIDOCAINE 50 MG/G
1 PATCH TOPICAL DAILY
Qty: 30 PATCH | Refills: 0 | Status: SHIPPED | OUTPATIENT
Start: 2021-01-25 | End: 2021-01-29

## 2021-01-24 RX ORDER — GABAPENTIN 300 MG/1
300 CAPSULE ORAL 3 TIMES DAILY
Qty: 90 CAPSULE | Refills: 0 | Status: SHIPPED | OUTPATIENT
Start: 2021-01-24 | End: 2021-01-29

## 2021-01-24 RX ADMIN — GABAPENTIN 300 MG: 300 CAPSULE ORAL at 08:36

## 2021-01-24 RX ADMIN — NICOTINE 21 MG: 21 PATCH, EXTENDED RELEASE TRANSDERMAL at 21:56

## 2021-01-24 RX ADMIN — HYDROXYZINE HYDROCHLORIDE 50 MG: 25 TABLET, FILM COATED ORAL at 08:40

## 2021-01-24 RX ADMIN — THIAMINE HCL TAB 100 MG 100 MG: 100 TAB at 08:36

## 2021-01-24 RX ADMIN — GABAPENTIN 300 MG: 300 CAPSULE ORAL at 21:56

## 2021-01-24 RX ADMIN — TRAZODONE HYDROCHLORIDE 50 MG: 50 TABLET ORAL at 21:56

## 2021-01-24 RX ADMIN — FOLIC ACID 1 MG: 1 TABLET ORAL at 08:36

## 2021-01-24 RX ADMIN — ESCITALOPRAM 5 MG: 5 TABLET, FILM COATED ORAL at 08:36

## 2021-01-24 RX ADMIN — GABAPENTIN 300 MG: 300 CAPSULE ORAL at 16:43

## 2021-01-24 RX ADMIN — LIDOCAINE 1 PATCH: 50 PATCH TOPICAL at 08:41

## 2021-01-24 RX ADMIN — Medication 1 TABLET: at 08:36

## 2021-01-24 NOTE — PROGRESS NOTES
Patient has been visible on the unit  Interacting appropriately with peers  Denies s/i or thoughts to hurt himself  Also denies feeling depressed or being anxious  Brightens on approach  Very pleasant and cooperative    Requested and received prn trazodone with HS medications

## 2021-01-24 NOTE — PROGRESS NOTES
Progress Note - [address] 60 y o  male MRN: [id number]   Unit/Bed#: Lakesha Thurman 251-02 Encounter: [id number]    Behavior over the last 24 hours:      Carolynn Boxer was seen for an inpatient follow-up psychiatric visit this date  At today's visit, he relates he is feeling somewhat anxious regarding discharge because he does not now if he will be able to work since his hand is still sore from surgery  Other than that, he denies any psychiatric symptoms including depression, suicidal/homicidal ideation, and psychosis  He is taking his medications as prescribed and feels they are helpful  He denies any side effects  His appetite and sleep are good  He is pleasant and social in the milieu  He is looking forward to going home tomorrow  ROS: no complaints, all other systems are negative    Mental Status Evaluation:    Appearance:  casually dressed, dressed appropriately   Behavior:  pleasant, cooperative, calm   Speech:  normal rate and volume   Mood:  euthymic   Affect:  normal range and intensity   Thought Process:  organized, logical, coherent   Associations: intact associations   Thought Content:  normal, no overt delusions   Perceptual Disturbances: none   Risk Potential: Suicidal ideation - None  Homicidal ideation - None  Potential for aggression - No   Sensorium:  oriented to person, place and time/date   Memory:  recent and remote memory grossly intact   Consciousness:  alert and awake   Attention: attention span and concentration are age appropriate   Insight:  fair   Judgment: fair   Gait/Station: normal gait/station, normal balance   Motor Activity: no abnormal movements     Vital signs in last 24 hours:    Temp:  [97 9 °F (36 6 °C)-98 4 °F (36 9 °C)] 97 9 °F (36 6 °C)  HR:  [78-96] 78  Resp:  [16] 16  BP: (116-136)/(78-91) 116/78    Laboratory results:  I have personally reviewed all pertinent laboratory/tests results      Progress Toward Goals: improved    Assessment/Plan   Principal Problem:    MDD (major depressive disorder), recurrent episode, moderate (HCC)  Active Problems:    Medical clearance for psychiatric admission    Alcohol abuse    Right wrist pain    Nicotine abuse    Alcohol use disorder, moderate, dependence (HCC)    Moderate protein-calorie malnutrition ([address] )    Recommended Treatment:     Continue current medications as prescribed  Continue to monitor  Plan is for discharge tomorrow pending any change in patient's status      All current active medications have been reviewed  Encourage group therapy, milieu therapy and occupational therapy  Behavioral Health checks every 7 minutes    Current Facility-Administered Medications   Medication Dose Route Frequency Provider Last Rate    acetaminophen  650 mg Oral Q6H PRN Domitila Leal MD      diphenhydrAMINE  25 mg Oral Q6H PRN MATEUS Davila      escitalopram  5 mg Oral Daily Domitila Leal MD      folic acid  1 mg Oral Daily Yu Ronquillo PA-C      gabapentin  300 mg Oral TID Domitila Leal MD      haloperidol lactate  5 mg Intramuscular Q6H PRN Domitila Leal MD      hydrOXYzine HCL  50 mg Oral Q4H PRN oDmitila Leal MD      ibuprofen  600 mg Oral Q8H PRN Domitila Leal MD      lidocaine  1 patch Topical Daily Yu Ronquillo PA-C      LORazepam  2 mg Intramuscular Q4H PRN Domitila Leal MD      LORazepam  2 mg Oral Q8H PRN Domitila Leal MD      multivitamin-minerals  1 tablet Oral Daily Yu Ronquillo PA-C      nicotine  21 mg Transdermal HS Yu Ronquillo PA-C      pneumococcal 23-valent polysaccharide vaccine  0 5 mL Subcutaneous Prior to discharge Domitila Leal MD      risperiDONE  2 mg Oral Q3H PRN Domitila Leal MD      thiamine  100 mg Oral Daily Yu Ronquillo PA-C      traZODone  50 mg Oral HS PRN Domitila Leal MD         Risks / Benefits of Treatment:    Risks, benefits, and possible side effects of medications explained to patient and patient verbalizes understanding and agreement for treatment  Counseling / Coordination of Care:      Patient's progress discussed with staff in treatment team meeting  Medications, treatment progress and treatment plan reviewed with patient

## 2021-01-25 PROCEDURE — 99232 SBSQ HOSP IP/OBS MODERATE 35: CPT | Performed by: PSYCHIATRY & NEUROLOGY

## 2021-01-25 RX ORDER — ESCITALOPRAM OXALATE 10 MG/1
10 TABLET ORAL DAILY
Status: DISCONTINUED | OUTPATIENT
Start: 2021-01-26 | End: 2021-01-29 | Stop reason: HOSPADM

## 2021-01-25 RX ORDER — MIRTAZAPINE 15 MG/1
15 TABLET, FILM COATED ORAL
Status: DISCONTINUED | OUTPATIENT
Start: 2021-01-25 | End: 2021-01-29 | Stop reason: HOSPADM

## 2021-01-25 RX ADMIN — Medication 1 TABLET: at 08:41

## 2021-01-25 RX ADMIN — GABAPENTIN 300 MG: 300 CAPSULE ORAL at 17:25

## 2021-01-25 RX ADMIN — GABAPENTIN 300 MG: 300 CAPSULE ORAL at 08:42

## 2021-01-25 RX ADMIN — FOLIC ACID 1 MG: 1 TABLET ORAL at 08:41

## 2021-01-25 RX ADMIN — THIAMINE HCL TAB 100 MG 100 MG: 100 TAB at 08:42

## 2021-01-25 RX ADMIN — ESCITALOPRAM 5 MG: 5 TABLET, FILM COATED ORAL at 08:41

## 2021-01-25 RX ADMIN — GABAPENTIN 300 MG: 300 CAPSULE ORAL at 21:12

## 2021-01-25 RX ADMIN — NICOTINE 21 MG: 21 PATCH, EXTENDED RELEASE TRANSDERMAL at 21:14

## 2021-01-25 RX ADMIN — LIDOCAINE 1 PATCH: 50 PATCH TOPICAL at 08:42

## 2021-01-25 RX ADMIN — MIRTAZAPINE 15 MG: 15 TABLET, FILM COATED ORAL at 21:12

## 2021-01-25 RX ADMIN — DIPHENHYDRAMINE HCL 25 MG: 25 TABLET ORAL at 08:42

## 2021-01-25 NOTE — PROGRESS NOTES
Patient has been visible on the unit Interacting with peers  Laughing and joking around  Very pleasant during interactions  Denies s/i  Reports he feels ready for dc tomorrow

## 2021-01-25 NOTE — DISCHARGE INSTR - APPOINTMENTS
The treatment team recommends that, immediately upon discharge, you obtain inpatient substance abuse counseling  Upon discharge, you will be transported by a rehab facility to a dual diagnosis inpatient substance abuse counseling facility, [facility] [place], [phone]; Fax:  [phone]  You are encouraged to participate in their array of services offered  Your summary of care will be faxed to this provider for continuity of care  The treatment recommends that, following your discharge from [facility], you obtain ongoing psychiatric medication management, individual therapy, and intensive outpatient counseling  During your treatment stay at [facility], please request that your counselor assist you in scheduling timely appointments for your receipt of psychiatric medication management, individual therapy, and intensive outpatient substance abuse counseling, with resources that include the following:     [facility], [address]; Phone:  [phone]; Fax:  [phone]  This agency could provide you with a referral for your receipt of intensive outpatient counseling  Dr Gilford Adams, psychiatrist at [address] [place] [address] [address]; Phone:  [phone]; Fax:  [phone]  This agency could provide you with services that include psychiatric medication management and individual therapy  Your summary of care will be faxed to this provider for continuity of care  Your primary care physician at [facility], [address], [place], [address] [facility]  [address]; Phone:  [phone]; Fax:  [phone]  Please schedule an appointment with your primary care physician, as needed  Please note that Eleni Will RN, our [facility] Nurse Navigator, will be calling you after your discharge, on the phone number that you provided    She will be available as an additional support, if needed  If you wish to speak with her, you may contact Gideon Essex at [phone]

## 2021-01-25 NOTE — PROGRESS NOTES
01/25/21 0900   Team Meeting   Meeting Type Daily Rounds   Initial Conference Date 01/25/21   Team Members Present   Team Members Present Physician;Nurse;   Physician Team Member Dr Evert Stout; Gonzalo Serna[address]   Nursing Team Member Joel Real RN   Social Work Team Member Karyn Espinosa [place]   Patient/Family Present   Patient's Family Present No     DC is cancelled today, as pt does not feel ready to leave

## 2021-01-25 NOTE — DISCHARGE INSTR - OTHER ORDERS
You will discharge home to a dual diagnosis rehab facility, [facility]; Phone:  [phone]; Fax:  [phone]; [phone] (your cell)  Crisis Plan:    Triggers you identified during your hospital stay that led to suicidal thoughts included:  Employment difficulties; financial stressors; chronic medical concerns that cause physical limitations; and increasing anxiety and depression  Coping skills you identified during your hospital stay include:  Hunting, fishing, biking, and sports on TV  After discharge, if you find your coping skills are not effective and you continue to feel distressed, please talk with trustworthy persons and / or contact your rehab counseling staff  If that is not effective and you feel you are a danger to yourself or others, please contact resources that include the following:    [facility] Intervention: [phone]  BEHAVIORAL MEDICINE AT [facility] Intake: [phone]    A Peer Hotline is also available M-F between the hours of 6-10pm, at [phone]  What you need to know aboutcoronavirus disease 2019 (COVID-19)     What is coronavirus disease 2019 (COVID-19)? Coronavirus disease 2019 (COVID-19) is a respiratory illness that can spread from person to person  The virus that causes COVID-19 is a novel coronavirus that was first identified during an investigation into an outbreak in [place], [place]  Can people in [place]  get COVID[place]19? Yes  COVID-19 is spreading from person to person in parts of the [place]  Risk of infection with COVID-19 is higher for people who are close contacts of someone known to have COVID-19, for example healthcare workers, or household members  Other people at higher risk for infection are those who live in or have recently been in an area with ongoing spread of COVID-19   Learn more about places with ongoing spread at [facility]  html#geographic  Have there been cases of COVID-19 in [place] ?   Yes  The first case of COVID-19 in the [place] was reported on January 21, 2020  The current count of cases of COVID-19 in the [place] is available on [employer] at [facility]  How does COVID-19 spread? The virus that causes COVID-19 probably emerged from an animal source, but is now spreading from person to person  The virus is thought to spread mainly between people who are in close contact with one another (within about 6 feet) through respiratory droplets produced when an infected person coughs or sneezes  It also may be possible that a person can get COVID-19 by touching a surface or object that has the virus on it and then touching their own mouth, nose, or possibly their eyes, but this is not thought to be the main way the virus spreads  Learn what is known about the spread of newly emerged coronaviruses at [facility]  What are the symptoms of COVID-19? Patients with COVID-19 have had mild to severe respiratory illness with symptoms of   fever   cough   shortness of breath  What are severe complications from this virus? Some patients have pneumonia in both lungs, multi-organ failure and in some cases death  How can I help protect myself? People can help protect themselves from respiratory illness with everyday preventive actions  Avoid close contact with people who are sick  Avoid touching your eyes, nose, and mouth withunwashed hands  Wash your hands often with soap and water for at least 20 seconds  Use an alcohol-based hand  that contains at least 60% alcohol if soap and water are not available  If you are sick, to keep from spreading respiratory illness to others, you should   Stay home when you are sick      Cover your cough or sneeze with a tissue, then throw the tissue in the trash  Clean and disinfect frequently touched objectsand surfaces  What should I do if I recently traveled from an area with ongoing spread of COVID-19? If you have traveled from an affected area, there may be restrictions on your movements for up to 2 weeks  If you develop symptoms during that period (fever, cough, trouble breathing), seek medical advice  Call the office of your health care provider before you go, and tell them about your travel and your symptoms  They will give you instructions on how to get care without exposing other people to your illness  While sick, avoid contact with people, don't go out and delay any travel to reduce the possibility of spreading illness to others  Is there a vaccine? There is currently no vaccine to protect against COVID-19  The best way to prevent infection is to take everyday preventive actions, like avoiding close contact with people who are sick and washing your hands often  Is there a treatment? There is no specific antiviral treatment for COVID-19  People with COVID-19 can seek medical care to helprelieve symptoms  For more information: www cdc gov/CLFCS75VC 647926-E 03/03/2020     What to do if you are sick withcoronavirus disease 2019 (COVID-19)     If you are sick with COVID-19 or suspect you are infected with the virus that causes COVID-19, follow the steps below to help prevent the disease from spreading to people in your home and community  Stay home except to get medical care   You should restrict activities outside your home, except for getting medical care  Do not go to work, school, or public areas  Avoid using public transportation, ride-sharing, or taxis  Separate yourself from other people and animals inyour home  People: As much as possible, you should stay in a specific room and away from other people in your home  Also, you should use a separate bathroom, if available    Animals: Do not handle pets or other animals while sick  See COVID-19 and Animals for more information  Call ahead before visiting your doctor   If you have a medical appointment, call the healthcare provider and tell them that you have or may have COVID-19  This will help the healthcare provider's office take steps to keep other people from getting infected or exposed  Wear a facemask  You should wear a facemask when you are around other people (e g , sharing a room or vehicle) or pets and before you enter a healthcare provider's office  If you are not able to wear a facemask (for example, because it causes trouble breathing), then people who live with you should not stay in the same room with you, or they should wear a facemask if they enteryour room  Cover your coughs and sneezes   Cover your mouth and nose with a tissue when you cough or sneeze  Throw used tissues in a lined trash can; immediately wash your hands with soap and water for at least 20 seconds or clean your hands with an alcohol-based hand  that contains at least 60 to 95% alcohol, covering all surfaces of your hands and rubbing them together until they feel dry  Soap and water should be used preferentially if hands are visibly dirty  Avoid sharing personal household items   You should not share dishes, drinking glasses, cups, eating utensils, towels, or bedding with other people or pets in your home  After using these items, they should be washed thoroughly with soap and water  Clean your hands often  Wash your hands often with soap and water for at least 20 seconds  If soap and water are not available, clean your hands with an alcohol-based hand  that contains at least 60% alcohol, covering all surfaces of your hands and rubbing them together until they feel dry  Soap and water should be used preferentially if hands are visibly dirty  Avoid touching your eyes, nose, and mouth with unwashed hands    Clean all "high-touch" surfaces every day  High touch surfaces include counters, tabletops, doorknobs, bathroom fixtures, toilets, phones, keyboards, tablets, and bedside tables  Also, clean any surfaces that may have blood, stool, or body fluids on them  Use a household cleaning spray or wipe, according to the label instructions  Labels contain instructions for safe and effective use of the cleaning product including precautions you should take when applying the product, such as wearing gloves and making sure you have good ventilation during use of the product  Monitor your symptoms  Seek prompt medical attention if your illness is worsening (e g , difficulty breathing)  Before seeking care, call your healthcare provider and tell them that you have, or are being evaluated for, COVID-19  Put on a facemask before you enter the facility  These steps will help the healthcare provider's office to keep other people in the office or waiting room from getting infectedor exposed  Ask your healthcare provider to call the local or state health department  Persons who are placed under active monitoring or facilitated self-monitoring should follow instructions provided by their local health department or occupational health professionals, as appropriate  If you have a medical emergency and need to call 911, notify the dispatch personnel that you have, or are being evaluated for COVID-19  If possible, put on a facemask before emergency medical services arrive  Discontinuing home isolation  Patients with confirmed COVID-19 should remain under home isolation precautions until the risk of secondary transmission to others is thought to be low  The decision to discontinue home isolation precautions should be made on a case-by-case basis, in consultation with healthcare providers and state and local health departments  For more information: www cdc gov/DTMKO08DQ 576887-H 02/24/2020     Stay home when you are sick,except to get medical care    Wash your hands often with soap and water for at least 20 seconds  Cover your cough or sneeze with a tissue, then throw the tissue in the trash  Clean and disinfect frequently touched objects and surfaces  Avoid touching your eyes, nose, and mouth  STOP THE SPREAD OF GERMS  For more information: www cdc gov/COVID19 Avoid close contact with people who are sick  Help prevent the spread of respiratory diseases like COVID-19

## 2021-01-25 NOTE — PROGRESS NOTES
01/25/21 0730   Activity/Group Checklist   Group   (Goal Planning and Communication)   Attendance Attended   Attendance Duration (min) 46-60   Interactions Interacted appropriately   Affect/Mood Appropriate;Bright;Calm   Goals Achieved Identified feelings; Discussed coping strategies; Able to listen to others; Able to engage in interactions

## 2021-01-25 NOTE — PROGRESS NOTES
01/25/21 0945   Activity/Group Checklist   Group   (Thoughts in our Heads Art Therapy Processing)   Attendance Attended   Attendance Duration (min) Greater than 60   Interactions Interacted appropriately   Affect/Mood Appropriate;Bright;Calm   Goals Achieved Identified feelings; Identified triggers; Discussed coping strategies; Discussed discharge plans; Discussed self-esteem issues; Identified resources and support systems; Able to listen to others; Able to engage in interactions

## 2021-01-25 NOTE — PROGRESS NOTES
Progress Note - Behavioral Health   Kayden Duran 60 y o  male MRN: [id number]  Unit/Bed#: [facility] 251-02 Encounter: [id number]    Assessment/Plan   Principal Problem:    MDD (major depressive disorder), recurrent episode, moderate (HCC)  Active Problems:    Right wrist pain    Nicotine abuse    Alcohol use disorder, moderate, dependence (HCC)    Moderate protein-calorie malnutrition (HCC)    P:  Increase Lexapro to 10 mg daily  Add Remeron 50 mg HS  Behavior over the last 24 hours:  Patient reports that he was feeling overwhelmed and depressed over the weekend and he was so scared of going back home as he felt he is not safe to go back home  Patient did not report direct suicidal thoughts but reports he had thoughts of hurting himself  Patient reports he is feeling a bit better today but still ambivalent about discharge  He does not feel he can contract for safety if he goes back home  Patient also is very worried about his ability to do his work as a  after the carpal tunnel wrist surgery that he just had  Sleep: insomnia  Appetite: poor  Medication side effects: No  ROS: hand pain    Mental Status Evaluation:  Appearance:  casually dressed   Behavior:  guarded   Speech:  loud   Mood:  anxious   Affect:  increased in intensity   Thought Process:  circumstantial   Thought Content:  obsessions   Perceptual Disturbances: None   Risk Potential: Suicidal Ideations without plan  Homicidal Ideations none  Potential for Aggression No   Sensorium:  person and place   Memory:  recent and remote memory grossly intact   Consciousness:  alert and awake    Attention: attention span appeared shorter than expected for age   Insight:  limited   Judgment: limited   Gait/Station: normal gait/station   Motor Activity: no abnormal movements     Progress Toward Goals: ongoing    Recommended Treatment: Continue with group therapy, milieu therapy and occupational therapy        Risks, benefits and possible side effects of Medications:   Risks, benefits, and possible side effects of medications explained to patient and patient verbalizes understanding  Medications: continue current psychiatric medications  Labs: I have personally reviewed all pertinent laboratory/tests results  Counseling / Coordination of Care  Total floor / unit time spent today 25 minutes  Greater than 50% of total time was spent with the patient and / or family counseling and / or coordination of care   A description of the counseling / coordination of care:

## 2021-01-25 NOTE — PROGRESS NOTES
Pt is being discharged with the following belongings:    1  6 pairs of socks    2  Underwear x6    3  Brown slippers    4  Black short sleeve shirt x2    5  Blue short sleeve shirt    6  Black long sleeve shirt x2    7  Blue long sleeve shirt    8  Blue jeans     9  Black zip up sweater    10  Blue/white flannel     11  Red flannel     12  1 pair of glasses    13  Red/ Green plaid robe    14  Green plaid sweat pants w/string    15  Dog print sweat pants w/string     16  Black flannel     17  Red bag w/straps    18  [employer] canvas bag w/straps    19  Sneakers w/string    20  Cell phone w/    21  Boots x 1pair    22   Coat     23 Belt

## 2021-01-25 NOTE — PROGRESS NOTES
Patient bright, alert, for breakfast and coffee group in am  Denies si hi and hallucinations  Pt is pleasant and smiling  Complaint with medications, aware of regimen  B/Lhands tremulous  Pt denies other signs of withdrawn with the exception of  Increased  Anxiety  Declines influenza and pneumonia vaccines  Patient is Nikolai but able to effectively communicate  Will maintain on safety precautions and continual monitoring  No needs identified

## 2021-01-26 PROCEDURE — 99232 SBSQ HOSP IP/OBS MODERATE 35: CPT | Performed by: PSYCHIATRY & NEUROLOGY

## 2021-01-26 RX ADMIN — FOLIC ACID 1 MG: 1 TABLET ORAL at 08:13

## 2021-01-26 RX ADMIN — GABAPENTIN 300 MG: 300 CAPSULE ORAL at 22:00

## 2021-01-26 RX ADMIN — NICOTINE 21 MG: 21 PATCH, EXTENDED RELEASE TRANSDERMAL at 23:00

## 2021-01-26 RX ADMIN — MIRTAZAPINE 15 MG: 15 TABLET, FILM COATED ORAL at 22:00

## 2021-01-26 RX ADMIN — TRAZODONE HYDROCHLORIDE 50 MG: 50 TABLET ORAL at 22:00

## 2021-01-26 RX ADMIN — GABAPENTIN 300 MG: 300 CAPSULE ORAL at 16:19

## 2021-01-26 RX ADMIN — Medication 1 TABLET: at 08:13

## 2021-01-26 RX ADMIN — ESCITALOPRAM OXALATE 10 MG: 10 TABLET ORAL at 08:13

## 2021-01-26 RX ADMIN — LIDOCAINE 1 PATCH: 50 PATCH TOPICAL at 08:13

## 2021-01-26 RX ADMIN — HYDROXYZINE HYDROCHLORIDE 50 MG: 25 TABLET, FILM COATED ORAL at 11:17

## 2021-01-26 RX ADMIN — GABAPENTIN 300 MG: 300 CAPSULE ORAL at 08:13

## 2021-01-26 RX ADMIN — THIAMINE HCL TAB 100 MG 100 MG: 100 TAB at 08:13

## 2021-01-26 NOTE — PROGRESS NOTES
01/26/21 0800   Team Meeting   Meeting Type Daily Rounds   Initial Conference Date 01/26/21   Team Members Present   Team Members Present Physician;Nurse;   Physician Team Member Dr Christina Gongora; Trish Erwin, [facility]   Nursing Team Member Dora Vazquez, BARRY   Social Work Team Member Reyna Elam   Patient/Family Present   Patient Present No   Patient's Family Present No     Discharge cancelled yesterday  Recently had carpal tunnel surgery and cannot return to work as a   Anxious about discharging  Discharge to occur tomorrow

## 2021-01-26 NOTE — PROGRESS NOTES
Patient has been visible on the unit  Attending and participating in evening unit activities  Social with peers  Denies s/i but states he still does nto feel ready for dc  He reports as his dc approached he started to think about his job and how he would be unable to go back due to his carpel tunnel  He says he then realized there was no job he felt he would be able to perform with his injured wrist and he would not be able to support himself  He became increasingly anxious and was afraid if he were to be dc he would go straight to the state store    He plans on applying for disability

## 2021-01-26 NOTE — PROGRESS NOTES
01/26/21 0945   Activity/Group Checklist   Group   (Personal Values and Art Therapy Processing)   Attendance Attended   Attendance Duration (min) 46-60   Interactions Interacted appropriately   Affect/Mood Appropriate   Goals Achieved Identified feelings; Identified triggers; Discussed coping strategies; Able to listen to others; Able to engage in interactions; Able to reflect/comment on own behavior;Able to self-disclose

## 2021-01-26 NOTE — PROGRESS NOTES
01/26/21 0734   Activity/Group Checklist   Group   (Goal Planning and Communication)   Attendance Attended   Attendance Duration (min) 46-60   Interactions Interacted appropriately   Affect/Mood Appropriate;Bright;Calm   Goals Achieved Identified feelings; Identified triggers; Discussed coping strategies; Able to listen to others; Able to engage in interactions

## 2021-01-26 NOTE — PROGRESS NOTES
Patient bright, alert in am , social and encouraging to peers  Denies si hi and hallucinations  Pt still feels like he needs more time here  Always pleasant, answers appropriately  [place] but able to make needs known  Will maintain on safety precautions and continual monitoring  No needs identified

## 2021-01-26 NOTE — PROGRESS NOTES
Progress Note - [address] 60 y o  male MRN: [id number]   [facility] Encounter: [id number]    Behavior over the last 24 hours:      Cory Herman was seen for an inpatient follow-up psychiatric visit this date  He is still endorsing anxiety regarding discharge but denies any depression, suicidal/homicidal ideation, or psychotic symptoms  He is taking his medications as prescribed  He denies any side effects  He is social and pleasant in the milieu  His mood and behavior are controlled on the unit  Plan is for discharge tomorrow pending any change in patient's status  ROS: no complaints, all other systems are negative    Mental Status Evaluation:    Appearance:  age appropriate, casually dressed   Behavior:  pleasant, cooperative, calm   Speech:  normal rate and volume   Mood:  anxious   Affect:  normal range and intensity   Thought Process:  organized, logical, coherent   Associations: intact associations   Thought Content:  normal, no overt delusions   Perceptual Disturbances: none   Risk Potential: Suicidal ideation - None  Homicidal ideation - None  Potential for aggression - No   Sensorium:  oriented to person, place and time/date   Memory:  recent and remote memory grossly intact   Consciousness:  alert and awake   Attention: attention span and concentration are age appropriate   Insight:  fair   Judgment: fair   Gait/Station: normal gait/station, normal balance   Motor Activity: no abnormal movements     Vital signs in last 24 hours:    Temp:  [97 8 °F (36 6 °C)-98 5 °F (36 9 °C)] 97 8 °F (36 6 °C)  HR:  [75-84] 75  Resp:  [16-18] 18  BP: ()/(70-72) 98/70    Laboratory results:  I have personally reviewed all pertinent laboratory/tests results      Progress Toward Goals: progressing    Assessment/Plan   Principal Problem:    MDD (major depressive disorder), recurrent episode, moderate (HCC)  Active Problems:    Right wrist pain    Nicotine abuse    Alcohol use disorder, moderate, dependence ([facility] [address] )    Moderate protein-calorie malnutrition ([facility] [address] )    Recommended Treatment:     Continue current medication as prescribed  Continue to monitor  Discharge disposition and planning are ongoing  All current active medications have been reviewed  Encourage group therapy, milieu therapy and occupational therapy  Behavioral Health checks every 7 minutes    Current Facility-Administered Medications   Medication Dose Route Frequency Provider Last Rate    acetaminophen  650 mg Oral Q6H PRN Jenny Krishnan MD      diphenhydrAMINE  25 mg Oral Q6H PRN MATEUS Davila      escitalopram  10 mg Oral Daily Jenny Krishnan MD      folic acid  1 mg Oral Daily Luz Yi PA-C      gabapentin  300 mg Oral TID Jenny Krishnan MD      haloperidol lactate  5 mg Intramuscular Q6H PRN Jenny Krishnan MD      hydrOXYzine HCL  50 mg Oral Q4H PRN Jenny Krishnan MD      ibuprofen  600 mg Oral Q8H PRN Jenny Krishnan MD      lidocaine  1 patch Topical Daily Luz Yi PA-C      LORazepam  2 mg Intramuscular Q4H PRN Jenny Krishnan MD      LORazepam  2 mg Oral Q8H PRN Jenny Krishnan MD      mirtazapine  15 mg Oral HS Jenny Krishnan MD      multivitamin-minerals  1 tablet Oral Daily Luz Yi PA-C      nicotine  21 mg Transdermal HS Luz Yi PA-C      pneumococcal 23-valent polysaccharide vaccine  0 5 mL Subcutaneous Prior to discharge Jenny Krishnan MD      risperiDONE  2 mg Oral Q3H PRN Jenny Krishnan MD      thiamine  100 mg Oral Daily Luz Yi PA-C      traZODone  50 mg Oral HS PRN Jenny Krishnan MD         Risks / Benefits of Treatment:    Risks, benefits, and possible side effects of medications explained to patient and patient verbalizes understanding and agreement for treatment  Counseling / Coordination of Care:      Patient's progress discussed with staff in treatment team meeting    Medications, treatment progress and treatment plan reviewed with patient

## 2021-01-27 PROCEDURE — 99232 SBSQ HOSP IP/OBS MODERATE 35: CPT | Performed by: PSYCHIATRY & NEUROLOGY

## 2021-01-27 RX ADMIN — GABAPENTIN 300 MG: 300 CAPSULE ORAL at 22:20

## 2021-01-27 RX ADMIN — NICOTINE 21 MG: 21 PATCH, EXTENDED RELEASE TRANSDERMAL at 22:25

## 2021-01-27 RX ADMIN — LIDOCAINE 1 PATCH: 50 PATCH TOPICAL at 08:39

## 2021-01-27 RX ADMIN — GABAPENTIN 300 MG: 300 CAPSULE ORAL at 17:37

## 2021-01-27 RX ADMIN — ESCITALOPRAM OXALATE 10 MG: 10 TABLET ORAL at 08:35

## 2021-01-27 RX ADMIN — TRAZODONE HYDROCHLORIDE 50 MG: 50 TABLET ORAL at 22:20

## 2021-01-27 RX ADMIN — THIAMINE HCL TAB 100 MG 100 MG: 100 TAB at 08:35

## 2021-01-27 RX ADMIN — Medication 1 TABLET: at 08:35

## 2021-01-27 RX ADMIN — GABAPENTIN 300 MG: 300 CAPSULE ORAL at 08:35

## 2021-01-27 RX ADMIN — FOLIC ACID 1 MG: 1 TABLET ORAL at 08:35

## 2021-01-27 RX ADMIN — MIRTAZAPINE 15 MG: 15 TABLET, FILM COATED ORAL at 22:20

## 2021-01-27 NOTE — PROGRESS NOTES
01/27/21 0730   Activity/Group Checklist   Group   (Goal Planning and Communication)   Attendance Attended   Attendance Duration (min) 46-60   Interactions Interacted appropriately   Affect/Mood Appropriate;Bright;Calm   Goals Achieved Identified feelings; Discussed coping strategies; Able to listen to others; Able to engage in interactions

## 2021-01-27 NOTE — PROGRESS NOTES
Progress Note - [address] 60 y o  male MRN: [id number]   Unit/Bed#: [facility]-02 Encounter: [id number]    Behavior over the last 24 hours:      Christal Mello was seen for an inpatient follow-up psychiatric visit this date  He relates his mood is good  He denies any depression or anxiety  He is worried about relapsing on alcohol when he leaves  Case management is working with him to establish outpatient services that would be appropriate for his needs  He is taking his medication as prescribed without any side effects  His appetite and sleep are within normal limits  He denies any other issues or concerns  ROS: no complaints, all other systems are negative    Mental Status Evaluation:    Appearance:  casually dressed, dressed appropriately   Behavior:  pleasant, cooperative, calm   Speech:  normal rate and volume   Mood:  improved, euthymic   Affect:  normal range and intensity   Thought Process:  organized, logical, coherent   Associations: intact associations   Thought Content:  normal, no overt delusions   Perceptual Disturbances: none   Risk Potential: Suicidal ideation - None  Homicidal ideation - None  Potential for aggression - No   Sensorium:  oriented to person, place and time/date   Memory:  recent and remote memory grossly intact   Consciousness:  alert and awake   Attention: decreased concentration and decreased attention span   Insight:  fair   Judgment: fair   Gait/Station: normal gait/station, normal balance   Motor Activity: no abnormal movements     Vital signs in last 24 hours:    Temp:  [98 3 °F (36 8 °C)-98 6 °F (37 °C)] 98 6 °F (37 °C)  HR:  [] 65  Resp:  [16-18] 18  BP: (103-110)/(74-84) 103/74    Laboratory results:  I have personally reviewed all pertinent laboratory/tests results      Progress Toward Goals: progressing    Assessment/Plan   Principal Problem:    MDD (major depressive disorder), recurrent episode, moderate (HCC)  Active Problems:    Right wrist pain    Nicotine abuse    Alcohol use disorder, moderate, dependence (HCC)    Moderate protein-calorie malnutrition ([address] )    Recommended Treatment:     Continue current medications as prescribed  Continue to monitor  Discharge disposition and planning are ongoing  All current active medications have been reviewed  Encourage group therapy, milieu therapy and occupational therapy  Behavioral Health checks every 7 minutes    Current Facility-Administered Medications   Medication Dose Route Frequency Provider Last Rate    acetaminophen  650 mg Oral Q6H PRN Eugenio Adams MD      diphenhydrAMINE  25 mg Oral Q6H PRN MATEUS Davila      escitalopram  10 mg Oral Daily Eugenio Adams MD      folic acid  1 mg Oral Daily Ginger Roach PA-C      gabapentin  300 mg Oral TID Eugenio Adams MD      haloperidol lactate  5 mg Intramuscular Q6H PRN Eugenio Adams MD      hydrOXYzine HCL  50 mg Oral Q4H PRN Eugenio Adams MD      ibuprofen  600 mg Oral Q8H PRN Eugenio Adams MD      lidocaine  1 patch Topical Daily Ginger Roach PA-C      LORazepam  2 mg Intramuscular Q4H PRN Eugenio Adams MD      LORazepam  2 mg Oral Q8H PRN Eugenio Adams MD      mirtazapine  15 mg Oral HS Eugenio Adams MD      multivitamin-minerals  1 tablet Oral Daily Ginger Roach PA-C      nicotine  21 mg Transdermal HS Ginger Roach PA-C      pneumococcal 23-valent polysaccharide vaccine  0 5 mL Subcutaneous Prior to discharge Eugenio Adams MD      risperiDONE  2 mg Oral Q3H PRN Eugenio Adams MD      thiamine  100 mg Oral Daily Ginger Roach PA-C      traZODone  50 mg Oral HS PRN Eugenio Adams MD         Risks / Benefits of Treatment:    Risks, benefits, and possible side effects of medications explained to patient and patient verbalizes understanding and agreement for treatment      Counseling / Coordination of Care:      Patient's progress discussed with staff in treatment team meeting  Medications, treatment progress and treatment plan reviewed with patient

## 2021-01-27 NOTE — PROGRESS NOTES
Patient has been sleeping throughout the shift  No signs or symptoms of pain, discomfort or respiratory distress  Will continue to observe during 7 minute safety checks

## 2021-01-27 NOTE — SOCIAL WORK
Pt's mother Savannah Link requested that SW discuss with pt option for receipt of substance abuse counseling regarding which SW will discuss with pt

## 2021-01-27 NOTE — PROGRESS NOTES
Pt visible on unit, social with peers and attending groups  Pt states he feels very good, but feels like he still might drink if he is discharged  Denies SI, HI, A/T/V  Compliant with meals and meds  Very pleasant and cooperative  No acute issues or concerns  Monitoring continues

## 2021-01-27 NOTE — PROGRESS NOTES
01/27/21 09:35   Team Meeting   Meeting Type Daily Rounds   Initial Conference Date 01/27/21   Team Members Present   Team Members Present Physician;Nurse;   Physician Team Member Dr Sonal Obregon; Vianney Coronaers, [address] [place] [address]   Nursing Team Member Laymon Bernheim, BARRY   Social Work Team Member Emilia Langston   Patient/Family Present   Patient Present No   Patient's Family Present No     Social, positive  DC on Thursday  Follow-up at [facility]

## 2021-01-27 NOTE — PROGRESS NOTES
01/27/21 0945   Activity/Group Checklist   Group   (Strengths Assessment and Art Therapy Processing)   Attendance Attended   Attendance Duration (min) 46-60   Interactions Interacted appropriately   Affect/Mood Appropriate;Bright;Calm   Goals Achieved Identified feelings; Discussed coping strategies; Increased hopefulness; Able to listen to others; Able to engage in interactions; Able to recieve feedback; Able to give feedback to another

## 2021-01-27 NOTE — PROGRESS NOTES
Patient has been visible on unit  Soicial with peers,pleasant and cooperative  Thought process is more clear  Denies any suicidal/homicidal ideation  No signs or symptoms of withdrawal observed  Pleasant and cooperative  Will continue to monitor for safety through 7 minute checks

## 2021-01-28 PROCEDURE — 99232 SBSQ HOSP IP/OBS MODERATE 35: CPT | Performed by: PSYCHIATRY & NEUROLOGY

## 2021-01-28 RX ADMIN — TRAZODONE HYDROCHLORIDE 50 MG: 50 TABLET ORAL at 21:34

## 2021-01-28 RX ADMIN — GABAPENTIN 300 MG: 300 CAPSULE ORAL at 08:26

## 2021-01-28 RX ADMIN — LIDOCAINE 1 PATCH: 50 PATCH TOPICAL at 08:26

## 2021-01-28 RX ADMIN — NICOTINE 21 MG: 21 PATCH, EXTENDED RELEASE TRANSDERMAL at 22:37

## 2021-01-28 RX ADMIN — ESCITALOPRAM OXALATE 10 MG: 10 TABLET ORAL at 08:26

## 2021-01-28 RX ADMIN — GABAPENTIN 300 MG: 300 CAPSULE ORAL at 17:24

## 2021-01-28 RX ADMIN — MIRTAZAPINE 15 MG: 15 TABLET, FILM COATED ORAL at 21:35

## 2021-01-28 RX ADMIN — FOLIC ACID 1 MG: 1 TABLET ORAL at 08:26

## 2021-01-28 RX ADMIN — THIAMINE HCL TAB 100 MG 100 MG: 100 TAB at 08:26

## 2021-01-28 RX ADMIN — Medication 1 TABLET: at 08:26

## 2021-01-28 RX ADMIN — GABAPENTIN 300 MG: 300 CAPSULE ORAL at 21:34

## 2021-01-28 NOTE — PROGRESS NOTES
01/28/21 0800   Team Meeting   Meeting Type Daily Rounds   Initial Conference Date 01/28/21   Team Members Present   Team Members Present Physician;Nurse;   Physician Team Member Dr Ramesh Ace; Eva Hernández, [address] [place] [address]   Nursing Team Member Carlo Santamaria, BARRY   Social Work Team Member Reyna Wheeler   Patient/Family Present   Patient Present No   Patient's Family Present No     Now reports he wants inpatient drug and alcohol rehab  CM working on this process  Stable for discharge once D&A bed is found

## 2021-01-28 NOTE — PROGRESS NOTES
01/28/21 0730   Activity/Group Checklist   Group   (Goal Planning and Communication)   Attendance Attended   Attendance Duration (min) 46-60   Interactions Interacted appropriately   Affect/Mood Appropriate;Bright;Calm   Goals Achieved Identified feelings; Discussed coping strategies; Able to listen to others; Able to engage in interactions

## 2021-01-28 NOTE — PROGRESS NOTES
Patient has been visible on the unit,social with select peers,pleasant and cooperative  Denies any S/H ideation, also denies any A/V/T hallucinations  Thought process has improved  Thinking is more clear at this time  Will continue to observe and monitor through 7minute safety checks

## 2021-01-28 NOTE — SOCIAL WORK
JULIUS met with pt to discuss referral to [facility] at [facility], regarding which he was in agreement  JULIUS called Admissions and faxed pt's clinical documentation  When JULIUS facilitated pt's call to Admissions, the rep stated that they do not accept his insurance, having suggested that pt's referral be made to one of the following providers: [facility], to which JULIUS faxed pt's documentation, as per his request; [facility], [phone]; and [facility] [address] [place] [address] Way, [phone]  JULIUS called Rep Ranjeet Mcmillan at [facility] who will callback to provide update as to whether pt is approved

## 2021-01-28 NOTE — PROGRESS NOTES
Pt visible on unit, social with peers and attending groups  Pleasant and cooperative  Denies SI, HI, A/T/V  Pt is on board for rehab  No acute questions or concerns  Compliant with meals and meds  Monitoring continues

## 2021-01-28 NOTE — PROGRESS NOTES
01/28/21 0945   Activity/Group Checklist   Group   (Projecting Hopes and Dreams for the Future)   Attendance Attended   Attendance Duration (min) 46-60   Interactions Interacted appropriately   Affect/Mood Appropriate;Bright;Calm   Goals Achieved Identified feelings; Discussed coping strategies; Able to listen to others; Able to engage in interactions; Able to reflect/comment on own behavior;Able to manage/cope with feelings; Able to self-disclose; Able to recieve feedback; Able to give feedback to another

## 2021-01-29 VITALS
OXYGEN SATURATION: 97 % | HEIGHT: 67 IN | RESPIRATION RATE: 16 BRPM | BODY MASS INDEX: 18.36 KG/M2 | SYSTOLIC BLOOD PRESSURE: 106 MMHG | DIASTOLIC BLOOD PRESSURE: 66 MMHG | TEMPERATURE: 97.8 F | HEART RATE: 69 BPM | WEIGHT: 117 LBS

## 2021-01-29 PROCEDURE — 99238 HOSP IP/OBS DSCHRG MGMT 30/<: CPT | Performed by: NURSE PRACTITIONER

## 2021-01-29 RX ORDER — MIRTAZAPINE 15 MG/1
15 TABLET, FILM COATED ORAL
Qty: 30 TABLET | Refills: 0 | Status: SHIPPED | OUTPATIENT
Start: 2021-01-29 | End: 2021-05-13

## 2021-01-29 RX ORDER — LIDOCAINE 50 MG/G
1 PATCH TOPICAL DAILY
Qty: 30 PATCH | Refills: 0 | Status: SHIPPED | OUTPATIENT
Start: 2021-01-29 | End: 2021-02-28

## 2021-01-29 RX ORDER — NICOTINE 21 MG/24HR
1 PATCH, TRANSDERMAL 24 HOURS TRANSDERMAL
Qty: 30 PATCH | Refills: 0 | Status: SHIPPED | OUTPATIENT
Start: 2021-01-29 | End: 2021-02-28

## 2021-01-29 RX ORDER — ESCITALOPRAM OXALATE 10 MG/1
10 TABLET ORAL DAILY
Qty: 30 TABLET | Refills: 0 | Status: SHIPPED | OUTPATIENT
Start: 2021-01-30 | End: 2021-05-13

## 2021-01-29 RX ORDER — GABAPENTIN 300 MG/1
300 CAPSULE ORAL 3 TIMES DAILY
Qty: 90 CAPSULE | Refills: 0 | Status: SHIPPED | OUTPATIENT
Start: 2021-01-29 | End: 2021-05-13

## 2021-01-29 RX ADMIN — THIAMINE HCL TAB 100 MG 100 MG: 100 TAB at 08:52

## 2021-01-29 RX ADMIN — Medication 1 TABLET: at 08:52

## 2021-01-29 RX ADMIN — GABAPENTIN 300 MG: 300 CAPSULE ORAL at 08:52

## 2021-01-29 RX ADMIN — ESCITALOPRAM OXALATE 10 MG: 10 TABLET ORAL at 08:52

## 2021-01-29 RX ADMIN — FOLIC ACID 1 MG: 1 TABLET ORAL at 08:52

## 2021-01-29 RX ADMIN — LIDOCAINE 1 PATCH: 50 PATCH TOPICAL at 08:52

## 2021-01-29 NOTE — PROGRESS NOTES
01/29/21 0730   Activity/Group Checklist   Group   (Goal Planning and Communication)   Attendance Attended   Attendance Duration (min) 46-60   Interactions Interacted appropriately   Affect/Mood Appropriate;Bright;Calm   Goals Achieved Identified feelings; Discussed coping strategies; Able to listen to others; Able to engage in interactions

## 2021-01-29 NOTE — DISCHARGE SUMMARY
Discharge Summary - Lindsey 60 y o  male MRN: [id number]  Unit/Bed#: [facility] 251-02 Encounter: [id number]     Admission Date: 1/19/2021         Discharge Date: 1/29/2021 12:15 PM    Attending Psychiatrist:  Kyara Nolasco MD    Reason for Admission/HPI:     Principal Problem:    MDD (major depressive disorder), recurrent episode, moderate ([address] )  Active Problems:    Right wrist pain    Nicotine abuse    Alcohol use disorder, moderate, dependence ([facility] [address] )    Moderate protein-calorie malnutrition ([facility] [address] )      Cory Herman is a 78-year-old male patient admitted to the [facility] Count includes the [facility] on a 201 voluntary commitment basis due to depression with suicidal ideation  Per emergency room evaluation completed by Dr Nadine Wheeler:    78-year-old male presents for psychiatric evaluation  The patient arrives in police custody under a 302 petition  [phone] petition is from the patient's mother who states that over the past several days the patient has been making comments through text messages to family members that he cannot take it anymore and is going to [place] off into the woods and ended    He has also made comments suggesting that family members should self some of his personal items after he has gone  The patient admits the same to me on my interview  He states that he has had lots of problems with carpal tunnel in his right wrist and can no longer work as a   He states that he can no longer live with the pain and is just done with it all    He does own multiple firearms and refers to one of them as his baby    He also admits to drinking vodka today  He denies any other ingestions or illicit drugs  Per crisis evaluation completed by Crisis Worker Kenn Cos:    CW met with patient who presented due to suicidal threats and an increase in alcohol consumption  Patient signed a 12 and is agreeable to IP treatment for his depression at this time    Patient reported that he has been out of work for about 8 weeks due to carpal tunnel surgery, he started drinking heavily daily about 4 weeks ago  Patient reports he is a  and his hand isnt better and he is stressed out because he feels he cant work anymore, he was to go back light duty over the weekend and did not show up  His family was concerned and petitioned a 36 after receiving messages about wanting to harm self  Patient does report feeling hopeless and that it would be better off if he wasn't here but denies a plan        Per psychiatric evaluation completed by Dr Governor Vargas:    Patient is a 60 y o  male presents with worsening depression and suicidal behavior by over drinking vodka  Patient reports that he has been getting increasingly depressed over the past few months after he was not able to work  Patient reports that he got surgery for carpal tunnel and he used to work as a  and is not able to do that anymore  Patient reports that he was coping by drinking a lot of vodka and was having thoughts that he is better off being dead  Patient was brought to the emergency room on a 302 petition by his family  Patient does reside with his mother who is his main support system  Patient denies using any drugs  He denies any previous psychiatric hospitalizations  Patient denies treatment in any drug and alcohol rehab in the past and seems to minimize his alcohol use  Hospital Course: The patient was admitted to the inpatient psychiatric unit and started on every 7 minutes precautions  During the hospitalization the patient was attending individual therapy, group therapy, milieu therapy and occupational therapy  Psychiatric medications were titrated over the hospital stay  To address depression, anxiety symptoms and insomnia the patient was started on antidepressant Lexapro and Remeron and anxiolytic medication Neurontin  Medication doses were titrated during the hospital course   Prior to beginning of treatment medications risks and benefits and possible side effects including risk of suicidality and serotonin syndrome related to treatment with antidepressants were reviewed with the patient  The patient verbalized understanding and agreement for treatment  Patient's symptoms improved gradually over the hospital course  At the end of treatment the patient was doing well  Mood was stable at the time of discharge  The patient denied suicidal ideation, intent or plan at the time of discharge and denied homicidal ideation, intent or plan at the time of discharge  There was no overt psychosis at the time of discharge  Sleep and appetite were improved  The patient was tolerating medications and was not reporting any significant side effects at the time of discharge  Since he was doing well at the end of the hospitalization, treatment team felt that Ruthie Casas could be safely discharged to outpatient care  The outpatient follow up with Inpatient drug and alcohol rehabilitation at [facility]  was arranged by the unit  upon discharge  Mental Status at time of Discharge:     Appearance:  casually dressed   Behavior:  normal   Speech:  normal pitch and normal volume   Mood:  normal   Affect:  normal   Thought Process:  normal   Thought Content:  normal   Perceptual Disturbances: None   Risk Potential: Patient denies any suicidal or homicidal ideation     Sensorium:  person, place, time/date and situation   Cognition:  recent and remote memory grossly intact   Consciousness:  alert and awake    Attention: attention span and concentration were age appropriate   Insight:  fair   Judgment: fair   Gait/Station: normal gait/station and normal balance   Motor Activity: no abnormal movements     Admission Diagnosis:Major depressive disorder, single episode, unspecified [F32 9]  Depression [F32 9]    Discharge Diagnosis:   Principal Problem:    MDD (major depressive disorder), recurrent episode, moderate (HCC)  Active Problems:    Right wrist pain    Nicotine abuse    Alcohol use disorder, moderate, dependence (HCC)    Moderate protein-calorie malnutrition ([address] )  Resolved Problems:    Medical clearance for psychiatric admission    Alcohol abuse        Lab results:  Admission on 01/19/2021, Discharged on 01/29/2021   Component Date Value    Sodium 01/20/2021 137     Potassium 01/20/2021 3 3*    Chloride 01/20/2021 99     CO2 01/20/2021 27     ANION GAP 01/20/2021 11     BUN 01/20/2021 12     Creatinine 01/20/2021 0 61*    Glucose 01/20/2021 86     Glucose, Fasting 01/20/2021 86     Calcium 01/20/2021 10 1     AST 01/20/2021 115*    ALT 01/20/2021 72*    Alkaline Phosphatase 01/20/2021 76     Total Protein 01/20/2021 6 5     Albumin 01/20/2021 4 3     Total Bilirubin 01/20/2021 1 30*    eGFR 01/20/2021 111     Magnesium 01/20/2021 1 6*    PTT Mixing Study Room Te* 01/20/2021      PTT Mixing Study Incubat* 01/20/2021      PTT 01/20/2021 27     PT Mixing Study Room Temp 01/20/2021      PT Mixing Study Incubated 01/20/2021      Protime 01/20/2021 13 0     Platelets 06/30/0612 78*    Protime 01/20/2021 13 1     INR 01/20/2021 1 00     PTT 01/20/2021 29     Thrombin Time 01/20/2021 16 1     Fibrinogen 01/20/2021 298        Discharge Medications:  Discharge Medication List as of 1/29/2021 12:02 PM      START taking these medications    Details   !! escitalopram (LEXAPRO) 10 mg tablet Take 1 tablet (10 mg total) by mouth daily, Starting Sat 1/30/2021, Until Mon 3/1/2021, Print      !! escitalopram (LEXAPRO) 5 mg tablet Take 1 tablet (5 mg total) by mouth daily, Starting Mon 1/25/2021, Until Wed 2/24/2021, Normal      mirtazapine (REMERON) 15 mg tablet Take 1 tablet (15 mg total) by mouth daily at bedtime, Starting Fri 1/29/2021, Until Sun 2/28/2021, Print      nicotine (NICODERM CQ) 21 mg/24 hr TD 24 hr patch Place 1 patch on the skin daily at bedtime, Starting Fri 1/29/2021, Until Sun 2/28/2021, Print       !! - Potential duplicate medications found  Please discuss with provider  Discharge Medication List as of 1/29/2021 12:02 PM         Discharge Medication List as of 1/29/2021 12:02 PM      CONTINUE these medications which have CHANGED    Details   gabapentin (NEURONTIN) 300 mg capsule Take 1 capsule (300 mg total) by mouth 3 (three) times a day, Starting Fri 1/29/2021, Until Sun 2/28/2021, Print      lidocaine (LIDODERM) 5 % Apply 1 patch topically daily Remove & Discard patch within 12 hours or as directed by MD, Starting Fri 1/29/2021, Until Sun 2/28/2021, Print            Discharge Medication List as of 1/29/2021 12:02 PM      CONTINUE these medications which have NOT CHANGED    Details   albuterol (PROVENTIL HFA,VENTOLIN HFA) 90 mcg/act inhaler Inhale 1 puff every 6 (six) hours as needed, Historical Med              Discharge instructions/Information to patient and family:   See after visit summary for information provided to patient and family  Provisions for Follow-Up Care:  See after visit summary for information related to follow-up care and any pertinent home health orders  Discharge Statement   I spent 30 minutes discharging the patient  This time was spent on the day of discharge  I had direct contact with the patient on the day of discharge  Additional documentation is required if more than 30 minutes were spent on discharge  I reviewed with Harriet Sweeney importance of compliance with medications and outpatient treatment after discharge

## 2021-01-29 NOTE — SOCIAL WORK
SW facilitated pt's interview by [facility] staff regarding which rep stated that he is approved for admission today  Pt stated understanding of and agreement to pay sum of $502 86 that is his insurance deductible regarding which the facility will arrange a suitable payment plan  SW discussed pt's discharged pt who feels ready for discharge, having denied SI / HI  He feels that he is decreasing alcohol addiction, day by day and is optimistic that [facility] will be very beneficial   Pt agreed to request rehab counselor to assist him in scheduling timely recovery appointments in the community  Pt expressed appreciation for [facility] services  SW called pt's mother [facility] who is relieved emotionally that pt is seeking IP rehab that she thinks he needs  She will support him to obtain recovery services upon his discharge back to her home, upon completion of rehab at [facility]  [facility] for [employer]

## 2021-01-29 NOTE — PROGRESS NOTES
01/29/21 0800   Team Meeting   Meeting Type Daily Rounds   Initial Conference Date 01/29/21   Team Members Present   Team Members Present Physician;Nurse;   Physician Team Member Dr Margo Cash; Jonas Kirk [place]   Nursing Team Member April Reeves RN   Social Work Team Member Reyna Lainez   Patient/Family Present   Patient Present No   Patient's Family Present No     Patient is a potential discharge today to [facility]  Otherwise, behaviors are controlled and patient has been pleasant and cooperative

## 2021-01-29 NOTE — PROGRESS NOTES
Patient  Belongings for D/C:    Socks x6, underwear x6, slippers x1, boots x1, t-shirt x3, long sleeve shirt x3, zip up sweater x1, flannel x2, glasses x1, jeans x2, robe x1, sweatpants x2, flannel x1, red bag x1, navy canvas bag x1, cell phone x1,  x1, thermal bottoms x1, pants x1, shirt x1, socks x1, coat x1, belt x1, wallet x1, $20 in cash, personal check x1, healthcare card x3, debit card x1, credit card x1, [place] DL x1

## 2021-01-29 NOTE — NURSING NOTE
[facility] to [facility]  Perosnal belongings inventoried  Prescriptions sent  Dis charge instructions sent  Pt verbalized understanding

## 2021-01-29 NOTE — DISCHARGE INSTRUCTIONS
Abuse of Alcohol   WHAT YOU NEED TO KNOW:   Alcohol abuse means you drink more than the recommended daily or weekly limits  You may be drinking alcohol regularly or drinking large amounts in a short period of time (binge drinking)  You continue to drink even though it causes legal, work, or relationship problems  DISCHARGE INSTRUCTIONS:   Call your local emergency number (911 in the [address]) for any of the following:   · You have sudden chest pain or trouble breathing  · You want to harm yourself or others  · You have a seizure or have shaking or trembling  Call your doctor if:   · You have hallucinations (you see or hear things that are not real)  · You cannot stop vomiting or you vomit blood  · You need help to stop drinking alcohol  · You have questions or concerns about your condition or care  Medicines:   · Vitamin supplements  may be given to treat low vitamin levels  Alcohol can make it hard for your body to absorb enough vitamins such as B1  Vitamin supplements may also be given to prevent alcohol related brain damage  · Take your medicine as directed  Contact your healthcare provider if you think your medicine is not helping or if you have side effects  Tell him or her if you are allergic to any medicine  Keep a list of the medicines, vitamins, and herbs you take  Include the amounts, and when and why you take them  Bring the list or the pill bottles to follow-up visits  Carry your medicine list with you in case of an emergency  Recommended alcohol limits:   · Men 21 to 64 years  should limit alcohol to 2 drinks a day  Do not have more than 4 drinks in 1 day or more than 14 in 1 week  · All women, and men 72 or older  should limit alcohol to 1 drink in a day  Do not have more than 3 drinks in 1 day or more than 7 in 1 week  No amount of alcohol is okay during pregnancy      Health problems alcohol abuse can cause:   · Cancer in your liver, pancreas, stomach, colon, kidney, or breast    · Stroke or a heart attack    · Liver, kidney, or lung disease    · Blackouts, memory loss, brain damage, or dementia    · Diabetes, immune system problems, or thiamine (vitamin B1) deficiency    · Problems for you and your baby if you drink while pregnant    Manage alcohol use:   · Decrease the amount you drink  This can help prevent health problems such as brain, heart, and liver damage, high blood pressure, diabetes, and cancer  If you cannot stop completely, healthcare providers can help you set goals to decrease the amount you drink  · Plan weekly alcohol use  You will be less likely to drink more than the recommended limit if you plan ahead  · Have food when you drink alcohol  Food will prevent alcohol from getting into your system too quickly  Eat before you have your first alcohol drink  · Time your drinks carefully  Have no more than 1 drink in an hour  Have a liquid such as water, coffee, or a soft drink between alcohol drinks  · Do not drive if you have had alcohol  Make sure someone who has not been drinking can help you get home  · Do not drink alcohol if you are taking medicine  Alcohol is dangerous when you combine it with certain medicines, such as acetaminophen or blood pressure medicine  Talk to your healthcare provider about all the medicines you currently take  Follow up with your healthcare provider as directed:  Write down your questions so you remember to ask them during your visits  For support and more information:   · Alcoholics Anonymous  Web Address: http://www [employer]/    · Substance Abuse and [address] [place]  Web Address: https://[employer]/  © [address] Information is for End User's use only and may not be sold, redistributed or otherwise used for commercial purposes   All illustrations and images included in CareNotes® are the copyrighted property of [employer]  or [address]   The above information is an  only  It is not intended as medical advice for individual conditions or treatments  Talk to your doctor, nurse or pharmacist before following any medical regimen to see if it is safe and effective for you  Cigarette Smoking and Your Health   WHAT YOU NEED TO KNOW:   What are the risks to my health if I smoke tobacco?  Nicotine and other chemicals found in tobacco and e-cigarettes can damage every cell in your body  Even if you are a light smoker, you have an increased risk for cancer, heart disease, and lung disease  If you are pregnant or have diabetes, smoking increases your risk for complications  Nicotine can affect an adolescent's developing brain  This can lead to trouble thinking, learning, or paying attention  What are the benefits to my health if I stop smoking? · You decrease respiratory symptoms such as coughing, wheezing, and shortness of breath  · You reduce your risk for cancers of the lung, mouth, throat, kidney, bladder, pancreas, stomach, and cervix  If you already have cancer, you increase the benefits of chemotherapy  You also reduce your risk for cancer returning or a second cancer from developing  · You reduce your risk for heart disease, blood clots, heart attack, and stroke  · You reduce your risk for lung infections, and diseases such as pneumonia, asthma, chronic bronchitis, and emphysema  · Your circulation improves  More oxygen can be delivered to your body  If you have diabetes, you lower your risk for complications, such as kidney, artery, and eye diseases  You also lower your risk for nerve damage  Nerve damage can lead to amputations, poor vision, and blindness  · You improve your body's ability to heal and to fight infections  · An adolescent can help his or her brain and body develop in a healthy way  Talk to your adolescent about all the health risks of nicotine   If you can, start talking about nicotine when your child is younger than 12 years  This may make it easier for him or her not to start using nicotine as a teenager or adult  Explain to him or her that it is best never to start  It can be hard to try to quit later  What are the health benefits to others if I stop smoking? Tobacco is harmful to nonsmokers who breathe in your secondhand smoke  The following are ways the health of others around you may improve when you stop smoking:  · You lower the risks for lung cancer and heart disease in nonsmoking adults  · If you are pregnant, you lower the risk for miscarriage, early delivery, low birth weight, and stillbirth  You also lower your baby's risk for SIDS, obesity, developmental delay, and neurobehavioral problems, such as ADHD  · If you have children, you lower their risk for ear infections, colds, pneumonia, bronchitis, and asthma  Where can I find more information and support to stop smoking? · [employer]  Phone: 9- 901 - 248-1095  Web Address: [employer]    CARE AGREEMENT:   You have the right to help plan your care  Learn about your health condition and how it may be treated  Discuss treatment options with your healthcare providers to decide what care you want to receive  You always have the right to refuse treatment  The above information is an  only  It is not intended as medical advice for individual conditions or treatments  Talk to your doctor, nurse or pharmacist before following any medical regimen to see if it is safe and effective for you  © Copyright [address] Information is for End User's use only and may not be sold, redistributed or otherwise used for commercial purposes  All illustrations and images included in CareNotes® are the copyrighted property of A D A M , Inc  or [facility] Kamille Odom     Depression   WHAT YOU NEED TO KNOW:   Depression is a medical condition that causes feelings of sadness or hopelessness that do not go away  Depression may cause you to lose interest in things you used to enjoy  These feelings may interfere with your daily life  DISCHARGE INSTRUCTIONS:   Call your local emergency number (911 in the [address] [place] [address] Rd[address]) if:   · You think about harming yourself or someone else  · You have done something on purpose to hurt yourself  Call your therapist or doctor if:   · Your symptoms do not improve  · You cannot make it to your next appointment  · You have new symptoms  · You have questions or concerns about your condition or care  The following resources are available at any time to help you, if needed:   · [facility]: 7-299.929.6785 (2-854-191-FJSZ)     · Suicide Hotline: 0-366.342.5645 (5-705-WNHEKHC)     · For a list of international numbers: https://save org/find-help/international-resources/    Medicines:   · Antidepressants  may be given to improve or balance your mood  You may need to take this medicine for several weeks before you begin to feel better  · Take your medicine as directed  Contact your healthcare provider if you think your medicine is not helping or if you have side effects  Tell him of her if you are allergic to any medicine  Keep a list of the medicines, vitamins, and herbs you take  Include the amounts, and when and why you take them  Bring the list or the pill bottles to follow-up visits  Carry your medicine list with you in case of an emergency  Therapy  is often used together with medicine to relieve depression  Therapy is a way for you to talk about your feelings and anything that may be causing depression  Therapy can be done alone or in a group  It may also be done with family members or a significant other  Self-care:   · Get regular physical activity  Try to be active for 30 minutes, 3 to 5 days a week  Physical activity can help relieve depression  Work with your healthcare provider to develop a plan that you enjoy   It may help to ask someone to be active with you  · Create a regular sleep schedule  A routine can help you relax before bed  Listen to music, read, or do yoga  Try to go to bed and wake up at the same time every day  Sleep is important for emotional health  · Eat a variety of healthy foods  Healthy foods include fruits, vegetables, whole-grain breads, low-fat dairy products, lean meats, fish, and cooked beans  A healthy meal plan is low in fat, salt, and added sugar  · Do not drink alcohol or use drugs  Alcohol and drugs can make depression worse  Talk to your therapist or doctor if you need help quitting  Follow up with your healthcare provider as directed: Your healthcare provider will monitor your progress at follow-up visits  He or she will also monitor your medicine if you take antidepressants  Your healthcare provider will ask if the medicine is helping  Tell him or her about any side effects or problems you may have with your medicine  The type or amount of medicine may need to be changed  Write down your questions so you remember to ask them during your visits  © Copyright [address] Information is for End User's use only and may not be sold, redistributed or otherwise used for commercial purposes  All illustrations and images included in CareNotes® are the copyrighted property of A D A M , Inc  or [address] [place] [address]  The above information is an  only  It is not intended as medical advice for individual conditions or treatments  Talk to your doctor, nurse or pharmacist before following any medical regimen to see if it is safe and effective for you  Albuterol (By breathing)   Albuterol (al-BUE-ter-ol)  Treats or prevents bronchospasm  Brand Name(s): ProAir Digihaler with eModule, ProAir HFA, ProAir RespiClick, Proventil HFA, Ventolin HFA   There may be other brand names for this medicine  When This Medicine Should Not Be Used: This medicine is not right for everyone   Do not use it if you had an allergic reaction to albuterol or milk proteins  How to Use This Medicine:   Aerosol, Powder Under Pressure, Suspension, Solution, Powder  · Your doctor will tell you how much medicine to use  Do not use more than directed  Ask your doctor or pharmacist if you have questions about how to use your inhaler, nebulizer, or medicine  · ProAir® HFA and Proventil® HFA aerosol inhaler:   ? You will use this medicine with a device called a metered-dose inhaler  The inhaler fits on the medicine canister and turns the medicine into a fine spray that you breathe in through your mouth and to your lungs  You may be told to use a spacer, which is a tube that is placed between the inhaler and your mouth  Your caregiver will show you how to use your inhaler and the spacer (if needed)  ? Shake the inhaler well just before each use  Avoid spraying this medicine into your eyes  ? Remove the cap and look at the mouthpiece to make sure it is clean  ? Prime the inhaler the first time you use it  Point the inhaler away from your face  Shake it well and spray into the air 3 times for ProAir® HFA or 4 times for Proventil® HFA   You also need to prime it when you have not used the inhaler for more than 2 weeks  ? To inhale this medicine, breathe out fully, trying to get as much air out of the lungs as possible  Put the mouthpiece just in front of your mouth with the canister upright  ? Hold your breath for about 5 to 10 seconds, and then breathe out slowly  ? If you are supposed to use more than one puff, wait 1 to 2 minutes before inhaling the second puff  Repeat these steps for the next puff, starting with shaking the inhaler  ? Clean the inhaler mouthpiece at least once a week with warm running water for 30 seconds  Let it air dry completely  Do not clean the metal canister or let it get wet  ? The Proventil® HFA inhaler has a window that shows the number of doses remaining   This tells you when you are getting low on medicine  The counter will turn red when there are only 20 doses left, to remind you to refill your prescription  · ProAir® Digihaler inhaler and ProAir® Respiclick® inhaler:   ? Make sure the cap is closed  Do not open the cap unless you are going to use the medicine  ? Hold the inhaler upright  Open the cap fully until you hear a click  Your inhaler is now ready to use  ? Close the cap firmly over the mouthpiece after each use  ? Do not use a spacer or volume holding chamber together with the ProAir® Digihaler   ? Keep the inhaler clean and dry at all times  Do not wash or put any part of the inhaler in water  Replace the ProAir® Digihaler if it has been washed or placed in water  ? To clean the mouthpiece, wipe it gently with a dry cloth or tissue  ? The inhaler provides about 200 inhalations  The dose counter will change to red when there are "20" doses left  Call your doctor or pharmacist for a refill of prescription or medicine  · Solution:   ? You will use this medicine with an inhaler device called a nebulizer  The nebulizer turns the medicine into a fine mist that you breathe in through your mouth and to your lungs  Your caregiver will show you how to use your nebulizer  · Read and follow the patient instructions that come with this medicine  Talk to your doctor or pharmacist if you have any questions  · Missed dose: Take a dose as soon as you remember  If it is almost time for your next dose, wait until then and take a regular dose  Do not take extra medicine to make up for a missed dose  · Solution (for nebulizer): Store unopened vials of this medicine at room temperature, away from heat and direct light  Do not freeze  An open vial of medicine must be used right away  · ProAir® HFA and Proventil® HFA aerosol inhaler: Store the canister at room temperature, away from heat and direct light  Do not freeze   Do not keep this medicine inside a car where it could be exposed to extreme heat or cold  Do not poke holes in the canister or throw it into a fire, even if the canister is empty  Store the inhaler with the mouthpiece down  · ProAir® Digihaler inhaler and ProAir® Respiclick® inhaler: Keep the medicine in the foil pouch until you are ready to use it  Store at room temperature, away from heat and direct light  Do not freeze  Throw it away 13 months after opening the foil pouch, when the dose counter reaches "0", or after the expiration date, whichever comes first   Drugs and Foods to Avoid:   Ask your doctor or pharmacist before using any other medicine, including over-the-counter medicines, vitamins, and herbal products  · Some medicines can affect how albuterol works  Tell your doctor if you are using any of the following:  ? Digoxin  ? Beta-blocker medicine  ? Diuretic (water pill)  ? Medicine for depression or an MAO inhibitor within the past 2 weeks  Warnings While Using This Medicine:   · Tell your doctor if you are pregnant or breastfeeding, or if you have kidney disease, diabetes, heart disease, heart rhythm problems, high blood pressure, thyroid problems, or a history of seizures  · This medicine may cause increased trouble breathing right after use (paradoxical bronchospasm), which may be life-threatening  · If you use a corticosteroid medicine to control your asthma, keep using it as instructed by your doctor  · Call your doctor if your symptoms do not improve or if they get worse  · If any of your asthma medicines do not seem to be working as well as usual, call your doctor right away  Do not change your doses or stop using your medicines without asking your doctor  · Your doctor will check your progress and the effects of this medicine at regular visits  Keep all appointments  · Keep all medicine out of the reach of children  Never share your medicine with anyone    Possible Side Effects While Using This Medicine:   Call your doctor right away if you notice any of these side effects:  · Allergic reaction: Itching or hives, swelling in your face or hands, swelling or tingling in your mouth or throat, chest tightness, trouble breathing  · Chest pain, fast, pounding, or uneven heartbeat  · Dry mouth, increased thirst, muscle cramps, nausea, vomiting  · Lightheadedness, dizziness, or fainting  · Trouble breathing, cough, chest tightness  If you notice these less serious side effects, talk with your doctor:   · Headache  · Runny or stuffy nose, sore throat  · Tremors, nervousness  If you notice other side effects that you think are caused by this medicine, tell your doctor  Call your doctor for medical advice about side effects  You may report side effects to FDA at 8-523-FDA-6972  © Copyright [address] [place] [address] Drive Information is for End User's use only and may not be sold, redistributed or otherwise used for commercial purposes  The above information is an  only  It is not intended as medical advice for individual conditions or treatments  Talk to your doctor, nurse or pharmacist before following any medical regimen to see if it is safe and effective for you  Lidocaine (On the skin)   Lidocaine (EVU-zgu-tsik)  Numbs the skin and relieves pain caused by shingles or other conditions  Treats urethritis and prevents and controls pain in procedures involving the male and female urethra  Also numbs and lubricates the nose, mouth, and throat for intubation  This medicine is a topical anesthetic  Brand Name(s): 7T Lido Gel, Anastia, Anecream, Aspercreme, Astero, Bengay, Blue Tube, Burn Relief, Burnamycin, CidalEaze, Corta-Cap Needle Ease, Eha, First Care Pain Relief Gel Patch, Gen7T Lotion, Gen7T Patch   There may be other brand names for this medicine  When This Medicine Should Not Be Used: This medicine is not right for everyone  Do not use it if you had an allergic reaction to lidocaine or similar medicines    How to Use This Medicine:   Cream, Foam, Gel/Jelly, Liquid, Lotion, Ointment, Pad, Patch, Spray  · Use this medicine as directed  Do not use more medicine or use it more often than your doctor tells you to  · Follow the instructions on the medicine label if you are using this medicine without a prescription  · Do not get this medicine into your eyes  If it does get into your eyes, rinse with water or saline solution right away  · Do not apply this medicine to open wounds, burns, broken or inflamed skin, or to a large area of skin unless directed by your doctor  · Patch:   ? Wash your hands with soap and water before and after applying a patch  ? Do not use the patch if it is damaged  Throw it away and get a new one   ? You may also cut the patch into smaller sizes with scissors before removing the patch release liner  ? Apply the patch right away to clean, dry, and intact skin  Do not put the patch over burns, cuts, or irritated skin  ? Put on a new patch if the old one has fallen off and cannot be reapplied  ? The patch will not stick if it gets wet  Do not wear it when you take a bath or shower, or when you go swimming  ? You may also use the Ztlido  patch during moderate exercise, including biking for 30 minutes  ? You may apply up to 3 Ztlido patches at a time  Do not wear the patch for longer than 12 hours in any 24-hour period  · Do not cover the treated area with a bandage unless directed by your doctor  · Do not eat or drink for at least 1 hour after you use this medicine in your mouth or throat  Do not chew gum or food while your mouth or throat feels numb  · Read and follow the patient instructions that come with this medicine  Talk to your doctor or pharmacist if you have any questions  · Missed dose: Apply a dose as soon as you can  If it is almost time for your next dose, wait until then and apply a regular dose  Do not apply extra medicine to make up for a missed dose    · Store the medicine in a closed container at room temperature, away from heat, moisture, and direct light  · Fold the used patch in half with the sticky sides together  Throw any used patch away so that children or pets cannot get to it  Drugs and Foods to Avoid:   Ask your doctor or pharmacist before using any other medicine, including over-the-counter medicines, vitamins, and herbal products  · Some medicines can affect how lidocaine works  Tell your doctor if you are using any of the following:  ? Acetaminophen, chloroquine, metoclopramide, primaquine, quinine, sulfasalazine  ? Cancer medicine (including cyclophosphamide, flutamide, hydroxyurea, ifosfamide, rasburicase)  ? Medicine for heart rhythm problems (including amiodarone, mexiletine, tocainide)  ? Medicine to treat an infection (including dapsone, nitrofurantoin, para-aminosalicylic acid, sulfonamide)  ? Medicine to treat seizures (including phenobarbital, phenytoin, sodium valproate)  ? Nitrate or nitrite medicine (including nitric oxide, nitroglycerin, nitroprusside, nitrous oxide)  Warnings While Using This Medicine:   · Tell your doctor if you are pregnant or breastfeeding, or if you have kidney disease, liver disease, heart problems, lung or breathing problems, porphyria (enzyme problem), a blood disorder, G6PD, or epilepsy  · This medicine may cause methemoglobinemia (blood disorder)  · Do not give lidocaine solution to a child younger than 1years of age for teething pain, unless your doctor says it is okay  · Do not use a heating pad, electric blanket, or any other heat source over the patch  However, you may apply Ztlido patch on the skin after exposure to a moderate heat setting  · Call your doctor if your symptoms do not improve or if they get worse  · Your doctor will do lab tests at regular visits to check on the effects of this medicine  Keep all appointments  · Keep all medicine out of the reach of children  Never share your medicine with anyone    Possible Side Effects While Using This Medicine:   Call your doctor right away if you notice any of these side effects:  · Allergic reaction: Itching or hives, swelling in your face or hands, swelling or tingling in your mouth or throat, chest tightness, trouble breathing  · Blurred or double vision  · Confusion, dizziness, drowsiness, lightheadedness  · Pale, gray, or blue lips, nails, or skin, dark urine, headache, unusual tiredness or weakness  · Slow, fast, or uneven heartbeat  · Tremor or seizures  · Troubled or shallow breathing, trouble swallowing  If you notice these less serious side effects, talk with your doctor:   · Eye irritation, sore eyes  · Redness, itching, burning, swelling, or blisters where the patch is applied  If you notice other side effects that you think are caused by this medicine, tell your doctor  Call your doctor for medical advice about side effects  You may report side effects to FDA at 3-974-FDA-6559  © Copyright [address] [place] [address] Drive Information is for End User's use only and may not be sold, redistributed or otherwise used for commercial purposes  The above information is an  only  It is not intended as medical advice for individual conditions or treatments  Talk to your doctor, nurse or pharmacist before following any medical regimen to see if it is safe and effective for you  Gabapentin (By mouth)   Gabapentin (nash-a-PEN-tin)  Treats seizures and pain caused by shingles  Brand Name(s): FusePaq Fanatrex, Gralise, Neurontin   There may be other brand names for this medicine  When This Medicine Should Not Be Used: This medicine is not right for everyone  Do not use it if you had an allergic reaction to gabapentin  How to Use This Medicine:   Capsule, Liquid, Tablet  · Take your medicine as directed  Your dose may need to be changed several times to find what works best for you  If you have epilepsy, do not allow more than 12 hours to pass between doses    · Capsule: Swallow the capsule whole with plenty of water  Do not open, crush, or chew it  · Gralise® tablet: Swallow the tablet whole   Do not crush, break, or chew it  · Neurontin® tablet: If you break a tablet into 2 pieces, use the second half as your next dose  Do not use the half-tablet if the whole tablet has been cut or broken after 28 days  · Oral liquid: Measure the oral liquid medicine with a marked measuring spoon, oral syringe, or medicine cup  · This medicine should come with a Medication Guide  Ask your pharmacist for a copy if you do not have one  · Missed dose: Take a dose as soon as you remember  If it is almost time for your next dose, wait until then and take a regular dose  Do not take extra medicine to make up for a missed dose  · Store the medicine in a closed container at room temperature, away from heat, moisture, and direct light  Store the Neurontin® oral liquid in the refrigerator  Do not freeze  Drugs and Foods to Avoid:   Ask your doctor or pharmacist before using any other medicine, including over-the-counter medicines, vitamins, and herbal products  · Some medicines can affect how gabapentin works  Tell your doctor if you also using hydrocodone or morphine  · If you take an antacid, wait at least 2 hours before you take gabapentin  · Do not drink alcohol while you are using this medicine  · Tell your doctor if you use anything else that makes you sleepy  Some examples are allergy medicine, narcotic pain medicine, and alcohol  Tell your doctor if you are also using lorazepam, oxycodone, or zolpidem  Warnings While Using This Medicine:   · Tell your doctor if you are pregnant or breastfeeding, or if you have kidney problems (including patients receiving dialysis) or lung problems  Tell your doctor if you have a history of depression or mental health problems    · This medicine may cause the following problems:  ? Drug reaction with eosinophilia and systemic symptoms (DRESS) or multiorgan hypersensitivity, which may damage the liver, kidney, blood, heart, or muscles  ? Changes in mood or behavior, including suicidal thoughts or behavior  ? Respiratory depression (serious breathing problem that can be life-threatening), when used with narcotic pain medicines  · Do not stop using this medicine suddenly  Your doctor will need to slowly decrease your dose before you stop it completely  · This medicine may make you dizzy or drowsy  Do not drive or do anything else that could be dangerous until you know how this medicine affects you  · Tell any doctor or dentist who treats you that you are using this medicine  This medicine may affect certain medical test results  · Your doctor will check your progress and the effects of this medicine at regular visits  Keep all appointments  · Keep all medicine out of the reach of children  Never share your medicine with anyone    Possible Side Effects While Using This Medicine:   Call your doctor right away if you notice any of these side effects:  · Allergic reaction: Itching or hives, swelling in your face or hands, swelling or tingling in your mouth or throat, chest tightness, trouble breathing  · Behavior problems, aggression, restlessness, trouble concentrating, moodiness (especially in children)  · Blistering, peeling, red skin rash  · Blue lips, fingernails, or skin, chest pain, fast heartbeat, trouble breathing  · Change in how much or how often you urinate, bloody or cloudy urine  · Dark urine or pale stools, nausea, vomiting, loss of appetite, stomach pain, yellow skin or eyes  · Fever, chills, cough, sore throat, body aches  · Problems with coordination, shakiness, unsteadiness, unusual eye movement  · Rapid weight gain, swelling in your hands, ankles, or feet  · Rash, swollen or tender glands in the neck, armpit, or groin  · Unusual moods or behaviors, thoughts of hurting yourself, feeling depressed  If you notice these less serious side effects, talk with your doctor:   · Dizziness, drowsiness, sleepiness, tiredness  If you notice other side effects that you think are caused by this medicine, tell your doctor  Call your doctor for medical advice about side effects  You may report side effects to FDA at 5-156-FDA-4809  © Copyright [facility]– [place]note [address] [address] Information is for End User's use only and may not be sold, redistributed or otherwise used for commercial purposes  The above information is an  only  It is not intended as medical advice for individual conditions or treatments  Talk to your doctor, nurse or pharmacist before following any medical regimen to see if it is safe and effective for you  Escitalopram (By mouth)   Escitalopram ([id number]-YXO-oh-pram)  Treats depression and generalized anxiety disorder (SUKHDEEP)  Brand Name(s): Lexapro   There may be other brand names for this medicine  When This Medicine Should Not Be Used: This medicine is not right for everyone  Do not use it if you had an allergic reaction to escitalopram or citalopram   How to Use This Medicine:   Liquid, Tablet  · Take this medicine as directed  You may need to take it for a month or more before you feel better  Your dose may need to be changed to find out what works best for you  · Measure the oral liquid medicine with a marked measuring spoon, oral syringe, or medicine cup  · This medicine should come with a Medication Guide  Ask your pharmacist for a copy if you do not have one  · Missed dose: Take a dose as soon as you remember  If it is almost time for your next dose, wait until then and take a regular dose  Do not take extra medicine to make up for a missed dose  · Store the medicine in a closed container at room temperature, away from heat, moisture, and direct light  Drugs and Foods to Avoid:   Ask your doctor or pharmacist before using any other medicine, including over-the-counter medicines, vitamins, and herbal products  · Do not use this medicine together with pimozide   Do not use this medicine and an MAO inhibitor (MAOI) within 14 days of each other  · Some medicines can affect how escitalopram works  Tell your doctor if you are using the following:   ? Buspirone, carbamazepine, fentanyl, lithium, Janie's wort, tramadol, or tryptophan supplements  ? Amphetamines  ? Blood thinner (including warfarin)  ? Diuretic (water pill)  ? NSAID pain or arthritis medicine (including aspirin, celecoxib, diclofenac, ibuprofen, naproxen)  ? Triptan medicine to treat migraine headaches (including sumatriptan)  · Tell your doctor if you use anything else that makes you sleepy  Some examples are allergy medicine, narcotic pain medicine, and alcohol  · Do not drink alcohol while you are using this medicine  Warnings While Using This Medicine:   · Tell your doctor if you are pregnant or breastfeeding, or if you have kidney disease, liver disease, bleeding problems, glaucoma, heart disease, or a seizure disorder  · For some children, teenagers, and young adults, this medicine may increase mental or emotional problems  This may lead to thoughts of suicide and violence  Talk with your doctor right away if you have any thoughts or behavior changes that concern you  Tell your doctor if you or anyone in your family has a history of bipolar disorder or suicide attempts  · This medicine may cause the following problems:   ? Serotonin syndrome (more likely when taken with certain medicines)  ? Low sodium levels  ? Increased risk of bleeding problems  · This medicine may make you dizzy or drowsy  Do not drive or do anything that could be dangerous until you know how this medicine affects you  · Your doctor may want to monitor your child's weight and height, because this medicine may cause decreased appetite and weight loss in children  · Do not stop using this medicine suddenly  Your doctor will need to slowly decrease your dose before you stop it completely    · Your doctor will check your progress and the effects of this medicine at regular visits  Keep all appointments  · Keep all medicine out of the reach of children  Never share your medicine with anyone  Possible Side Effects While Using This Medicine:   Call your doctor right away if you notice any of these side effects:  · Allergic reaction: Itching or hives, swelling in your face or hands, swelling or tingling in your mouth or throat, chest tightness, trouble breathing  · Anxiety, restlessness, fever, sweating, muscle spasms, nausea, vomiting, diarrhea, seeing or hearing things that are not there  · Confusion, weakness, and muscle twitching  · Eye pain, vision changes, seeing halos around lights  · Fast, pounding, or uneven heartbeat  · Feeling more excited or energetic than usual, racing thoughts, trouble sleeping  · Seizures  · Thoughts of hurting yourself or others, unusual behavior  · Unusual bleeding or bruising  If you notice these less serious side effects, talk with your doctor:   · Dizziness, drowsiness, or sleepiness  · Dry mouth  · Headache  · Nausea, constipation, diarrhea  · Sexual problems  If you notice other side effects that you think are caused by this medicine, tell your doctor  Call your doctor for medical advice about side effects  You may report side effects to FDA at 1-764-FDA-5977  © Copyright [address] Information is for End User's use only and may not be sold, redistributed or otherwise used for commercial purposes  The above information is an  only  It is not intended as medical advice for individual conditions or treatments  Talk to your doctor, nurse or pharmacist before following any medical regimen to see if it is safe and effective for you

## 2021-01-29 NOTE — PROGRESS NOTES
Report given to Nakul Grover from retreat ([phone]) She will call with transport time once scheduled

## 2021-01-29 NOTE — BH TRANSITION RECORD
Contact Information: If you have any questions, concerns, pended studies, tests and/or procedures, or emergencies regarding your inpatient behavioral health visit  Please contact Maryann Shaver[facility] behavioral health unit [phone] and ask to speak to a , nurse or physician  A contact is available 24 hours/ 7 days a week at this number  Summary of Procedures Performed During your Stay:  Below is a list of major procedures performed during your hospital stay and a summary of results:  - No major procedures performed  Pending Studies (From admission, onward)    None        If studies are pending at discharge, follow up with your PCP and/or referring provider

## 2021-01-29 NOTE — PROGRESS NOTES
Patient was visible on unit,pleasant,social and cooperative  Thought process is more clear  Patient understands the need for further support through inpatient rehab  Denies any S/H ideation  Will continue to observe and monitor during 7 minute safety checks

## 2021-01-29 NOTE — PROGRESS NOTES
Patient bright, alert, drinking coffee in am and social  Patient states he wants to talk to the Dr in regards to his medications  Says his wellbutrin is helping with the depression but may be causing restless legs and increased anxiety  He does not want his wellbutrin decreased but would rather add a medication for anxiety  Denies si hi and hallucinations  Will  Maintain on safety precautions and continual monitoring  No needs identified

## 2021-01-29 NOTE — PROGRESS NOTES
Pt visible on unit  Bright  Pleasant  Pt states, "im feeling better everyday" Limited insight on substance use  Discussed benefits of following through with rehab will continue to educate  Compliant with medications and meals  Able to communicate needs  Safety precautions maintained  Will continue to monitor and assess

## 2021-01-29 NOTE — PROGRESS NOTES
Progress Note - Lindsey 60 y o  male MRN: [id number]  Unit/Bed#: [facility] 251-02 Encounter: [id number]    Assessment/Plan   Principal Problem:    MDD (major depressive disorder), recurrent episode, moderate (HCC)  Active Problems:    Right wrist pain    Nicotine abuse    Alcohol use disorder, moderate, dependence (HCC)    Moderate protein-calorie malnutrition (HCC)      Behavior over the last 24 hours:  Improved  Patient feels ready for discharge and is hoping to get into a rehab soon  He said he is motivated to stop drinking and wants to be in best shape before going back home and back to work  Sleep: normal  Appetite: normal  Medication side effects: No  ROS: no complaints    Mental Status Evaluation:  Appearance:  casually dressed   Behavior:  normal   Speech:  normal pitch   Mood:  anxious   Affect:  normal   Thought Process:  circumstantial   Thought Content:  normal   Perceptual Disturbances: None   Risk Potential: Suicidal Ideations none  Homicidal Ideations none  Potential for Aggression No   Sensorium:  person and place   Memory:  recent and remote memory grossly intact   Consciousness:  awake    Attention: attention span and concentration were age appropriate   Insight:  limited   Judgment: limited   Gait/Station: normal gait/station   Motor Activity: no abnormal movements     Progress Toward Goals: ongoing    Recommended Treatment: Continue with group therapy, milieu therapy and occupational therapy  Risks, benefits and possible side effects of Medications:   Risks, benefits, and possible side effects of medications explained to patient and patient verbalizes understanding  Medications: all current active meds have been reviewed  Labs: I have personally reviewed all pertinent laboratory/tests results  Counseling / Coordination of Care  Total floor / unit time spent today 25 minutes   Greater than 50% of total time was spent with the patient and / or family counseling and / or coordination of care   A description of the counseling / coordination of care:

## 2021-04-22 ENCOUNTER — PREP FOR PROCEDURE (OUTPATIENT)
Dept: GASTROENTEROLOGY | Facility: CLINIC | Age: 56
End: 2021-04-22

## 2021-04-22 ENCOUNTER — OFFICE VISIT (OUTPATIENT)
Dept: GASTROENTEROLOGY | Facility: CLINIC | Age: 56
End: 2021-04-22
Payer: COMMERCIAL

## 2021-04-22 VITALS
WEIGHT: 128.4 LBS | DIASTOLIC BLOOD PRESSURE: 70 MMHG | BODY MASS INDEX: 19.46 KG/M2 | HEIGHT: 68 IN | HEART RATE: 68 BPM | SYSTOLIC BLOOD PRESSURE: 100 MMHG

## 2021-04-22 DIAGNOSIS — R19.7 DIARRHEA, UNSPECIFIED TYPE: Primary | ICD-10-CM

## 2021-04-22 PROCEDURE — 99213 OFFICE O/P EST LOW 20 MIN: CPT | Performed by: PHYSICIAN ASSISTANT

## 2021-04-22 RX ORDER — HYDROXYZINE PAMOATE 25 MG/1
CAPSULE ORAL
COMMUNITY
Start: 2021-04-19

## 2021-04-22 RX ORDER — METRONIDAZOLE 500 MG/1
TABLET ORAL
COMMUNITY
Start: 2021-04-20

## 2021-04-22 RX ORDER — DICYCLOMINE HYDROCHLORIDE 10 MG/1
CAPSULE ORAL
COMMUNITY
Start: 2021-04-07 | End: 2021-06-23

## 2021-04-22 RX ORDER — CHOLESTYRAMINE LIGHT 4 G/5.7G
POWDER, FOR SUSPENSION ORAL
COMMUNITY
Start: 2021-03-11 | End: 2021-06-23

## 2021-04-22 RX ORDER — NALTREXONE HYDROCHLORIDE 50 MG/1
TABLET, FILM COATED ORAL
COMMUNITY
Start: 2021-04-07

## 2021-04-22 NOTE — PROGRESS NOTES
[facility] - Outpatient Consultation  Omega Oliveros 60 y o  male MRN: [id number]  Encounter: [id number]          ASSESSMENT AND PLAN:      1  Diarrhea, unspecified type  -Will plan to update laboratories to include thyroid function testing, sedimentation rate, CRP, celiac profile     -Will plan colonoscopy with biopsy to rule out microscopic colitis  -Patient will start taking fiber supplementation daily     -Patient will continue acidophilus     -Patient will have a follow-up appointment after all testing is complete   ______________________________________________________________________    HPI:   68-year-old male who presents to the office today with a chief complaint of diarrhea  Patient reports he has been suffering from chronic diarrhea now for the past 2 and half months  Patient reports that he most recently did have stool cultures performed that were negative for bacteria  Patient reports that he does eat multiple times throughout the course of the day and he has always done this  Patient is now reporting 5-10 cm the stools a day  He denies any rectal bleeding or melena  Patient's last colonoscopy was in 2013 and this was a normal examination  Patient reports that his weight is stable and he is eating very well  He denies any of oral pain or rectal pain  He denies any nausea or vomiting  Patient denies any family history of Crohn's disease or ulcerative colitis  Patient's last CBC was normal     REVIEW OF SYSTEMS:    CONSTITUTIONAL: Denies any fever, chills, rigors, and weight loss  HEENT: No earache or tinnitus  Denies hearing loss or visual disturbances  CARDIOVASCULAR: No chest pain or palpitations  RESPIRATORY: Denies any cough, hemoptysis, shortness of breath or dyspnea on exertion  GASTROINTESTINAL: As noted in the History of Present Illness  GENITOURINARY: No problems with urination  Denies any hematuria or dysuria    NEUROLOGIC: No dizziness or vertigo, denies headaches  MUSCULOSKELETAL: Denies any muscle or joint pain  SKIN: Denies skin rashes or itching  ENDOCRINE: Denies excessive thirst  Denies intolerance to heat or cold  PSYCHOSOCIAL: Denies depression or anxiety  Denies any recent memory loss  Historical Information   Past Medical History:   Diagnosis Date    Substance abuse [facility])      Past Surgical History:   Procedure Laterality Date    CARPAL TUNNEL RELEASE Right 12/2020     Social History   Social History     Substance and Sexual Activity   Alcohol Use Yes    Frequency: 4 or more times a week    Drinks per session: 10 or more    Comment: rufino reprots drinking daily about a "handle" of vodka straight      Social History     Substance and Sexual Activity   Drug Use Not Currently     Social History     Tobacco Use   Smoking Status Current Every Day Smoker    Packs/day: 1 00   Smokeless Tobacco Never Used     Family History   Problem Relation Age of Onset    No Known Problems Mother     Cancer Father        Meds/Allergies       Current Outpatient Medications:     albuterol (PROVENTIL HFA,VENTOLIN HFA) 90 mcg/act inhaler    cholestyramine light (PREVALITE) 4 GM/DOSE powder    dicyclomine (BENTYL) 10 mg capsule    hydrOXYzine pamoate (VISTARIL) 25 mg capsule    metroNIDAZOLE (FLAGYL) 500 mg tablet    naltrexone (REVIA) 50 mg tablet    escitalopram (LEXAPRO) 10 mg tablet    escitalopram (LEXAPRO) 5 mg tablet    gabapentin (NEURONTIN) 300 mg capsule    lidocaine (LIDODERM) 5 %    mirtazapine (REMERON) 15 mg tablet    nicotine (NICODERM CQ) 21 mg/24 hr TD 24 hr patch    Allergies   Allergen Reactions    Pollen Extract Other (See Comments)           Objective     Blood pressure 100/70, pulse 68, height 5' 8" (1 727 m), weight 58 2 kg (128 lb 6 4 oz)  Body mass index is 19 52 kg/m²          PHYSICAL EXAM:      General Appearance:   Alert, cooperative, no distress   HEENT:   Normocephalic, atraumatic, anicteric      Neck:  Supple, symmetrical, trachea midline   Lungs:   Clear to auscultation bilaterally; no rales, rhonchi or wheezing; respirations unlabored    Heart[de-identified]   Regular rate and rhythm; no murmur, rub, or gallop  Abdomen:   Soft, non-tender, non-distended; normal bowel sounds; no masses, no organomegaly    Genitalia:   Deferred    Rectal:   Deferred    Extremities:  No cyanosis, clubbing or edema    Pulses:  2+ and symmetric    Skin:  No jaundice, rashes, or lesions    Lymph nodes:  No palpable cervical lymphadenopathy        Lab Results:   No visits with results within 1 Day(s) from this visit     Latest known visit with results is:   Admission on 01/17/2021, Discharged on 01/19/2021   Component Date Value    Amph/Meth UR 01/17/2021 Negative     Barbiturate Ur 01/17/2021 Negative     Benzodiazepine Urine 01/17/2021 Negative     Cocaine Urine 01/17/2021 Negative     Methadone Urine 01/17/2021 Negative     Opiate Urine 01/17/2021 Negative     PCP Ur 01/17/2021 Negative     THC Urine 01/17/2021 Negative     Oxycodone Urine 01/17/2021 Negative     EXTBreath Alcohol 01/17/2021 Unable to obtain     Ethanol Lvl 01/17/2021 380*    SARS-CoV-2 01/18/2021 Negative     INFLUENZA A PCR 01/18/2021 Negative     INFLUENZA B PCR 01/18/2021 Negative     RSV PCR 01/18/2021 Negative     WBC 01/18/2021 2 89*    RBC 01/18/2021 4 53     Hemoglobin 01/18/2021 14 9     Hematocrit 01/18/2021 42 2     MCV 01/18/2021 93     MCH 01/18/2021 32 9     MCHC 01/18/2021 35 3     RDW 01/18/2021 12 9     MPV 01/18/2021 9 9     Platelets 66/95/4326 94*    nRBC 01/18/2021 0     Neutrophils Relative 01/18/2021 65     Immat GRANS % 01/18/2021 0     Lymphocytes Relative 01/18/2021 19     Monocytes Relative 01/18/2021 13*    Eosinophils Relative 01/18/2021 1     Basophils Relative 01/18/2021 2*    Neutrophils Absolute 01/18/2021 1 91     Immature Grans Absolute 01/18/2021 0 00     Lymphocytes Absolute 01/18/2021 0 55*    Monocytes Absolute 01/18/2021 0 36     Eosinophils Absolute 01/18/2021 0 02     Basophils Absolute 01/18/2021 0 05     Sodium 01/18/2021 140     Potassium 01/18/2021 4 1     Chloride 01/18/2021 101     CO2 01/18/2021 26     ANION GAP 01/18/2021 13     BUN 01/18/2021 13     Creatinine 01/18/2021 0 73     Glucose 01/18/2021 97     Calcium 01/18/2021 9 3     eGFR 01/18/2021 103     TSH 3RD GENERATON 01/18/2021 0 774     Color, UA 01/18/2021 Yellow     Clarity, UA 01/18/2021 Clear     Specific Gravity, UA 01/18/2021 1 020     pH, UA 01/18/2021 7 0     Leukocytes, UA 01/18/2021 Negative     Nitrite, UA 01/18/2021 Negative     Protein, UA 01/18/2021 Negative     Glucose, UA 01/18/2021 Negative     Ketones, UA 01/18/2021 >=80 (3+)*    Urobilinogen, UA 01/18/2021 1 0     Bilirubin, UA 01/18/2021 Negative     Blood, UA 01/18/2021 Negative     Ventricular Rate 01/18/2021 113     Atrial Rate 01/18/2021 113     NV Interval 01/18/2021 130     QRSD Interval 01/18/2021 92     QT Interval 01/18/2021 354     QTC Interval 01/18/2021 485     P Axis 01/18/2021 69     QRS Axis 01/18/2021 73     T Wave Axis 01/18/2021 55          Radiology Results:   No results found

## 2021-04-22 NOTE — PATIENT INSTRUCTIONS
Nutrition Tips for Relief of Diarrhea   WHAT YOU NEED TO KNOW:   There are diet changes you can make to help relieve or stop diarrhea  These changes include limiting or avoiding foods and liquids that are high in sugar, fat, fiber, and lactose  Lactose is a sugar found in milk products  Milk products can cause diarrhea in people who are lactose intolerant  You should also drink extra liquids to replace fluids that are lost when you have diarrhea  Diarrhea can lead to dehydration  DISCHARGE INSTRUCTIONS:   Foods to limit or avoid:   · Dairy:      ? Whole milk    ? Half-and-half, cream, and sour cream    ? Regular (whole milk) ice cream    · Grains:      ? Whole wheat and whole grain breads, pasta, cereals, and crackers    ? Ulus Gene and wild rice    ? Breads and cereals with seeds or nuts    ? Popcorn    · Fruit and vegetables:      ? All raw fruits, except bananas and melon    ? Dried fruits, including prunes and raisins    ? Canned fruit in heavy syrup    ? Prune juice and any fruit juice with pulp    ? Raw vegetables, except lettuce     ? Fried vegetables    ? Corn, raw and cooked broccoli, cabbage, cauliflower, and christiano greens    · Protein:      ? Fried meat, poultry, and fish    ? High-fat luncheon meats, such as bologna    ? Fatty meats, such as sausage, ruiz, and hot dogs    ? Beans and nuts    · Liquids:      ? Sodas and fruit-flavored drinks    ? Drinks that contain caffeine, such as energy drinks, coffee, and tea     ? Drinks that contain alcohol or sugar alcohol, such as sorbitol    Foods and liquids you may eat or drink:  Most people can tolerate the foods and liquids listed below  If any of them make your symptoms worse, stop eating or drinking them until you feel better  If you are lactose intolerant, avoid milk products  · Dairy:      ? Skim or low-fat milk or evaporated milk    ? Soy milk or buttermilk     ? Low-fat, part-skim, and aged cheese    ?  Yogurt, low-fat ice cream, or sherbert    · Grains:  (Choose foods with less than 2 grams of dietary fiber per serving )     ? White or refined flour breads, bagels, pasta, and crackers    ? Cold or hot cereals made from white or refined flour such as puffed rice, cornflakes, or cream of wheat    ? White rice    · Fruit and vegetables:      ? Bananas or melon    ? Fruit juice without pulp, except prune juice    ? Canned fruit in juice or light syrup    ? Lettuce and most well-cooked vegetables without seeds or skins     ? Strained vegetable juice    · Protein:      ? Tender, well-cooked meat, poultry, or fish    ? Well-cooked eggs or soy foods (cooked without added fat)    ? Smooth nut butters    · Fats:  (Limit fats to less than 8 teaspoons a day)     ? Oil, butter, or margarine, or mayonnaise    ? Cream cheese or salad dressings    · Liquids:      ? For infants, breast milk or formula    ? Oral rehydration solution     ? Decaffeinated coffee or caffeine-free teas    ? Soft drinks without caffeine    Other guidelines to follow:   · Drink liquids as directed  You may need to drink more liquids than usual to prevent dehydration  Ask how much liquid to drink each day and which liquids are best for you  You may need to drink an oral rehydration solution (ORS)  An ORS helps replace fluids and electrolytes that you lose when you have diarrhea  · Eat small meals or snacks every 3 to 4 hours  instead of large meals  Continue eating even if you still have diarrhea  Your diarrhea will continue for a few days but should gradually go away  © Copyright [address] Information is for End User's use only and may not be sold, redistributed or otherwise used for commercial purposes  All illustrations and images included in CareNotes® are the copyrighted property of [employer]  or [facility] Kamille Odom   The above information is an  only  It is not intended as medical advice for individual conditions or treatments   Talk to your doctor, nurse or pharmacist before following any medical regimen to see if it is safe and effective for you

## 2021-04-22 NOTE — LETTER
April 22, 2021     Juan Kong MD  [address]  [address] [facility] [address] [place] [address]    Patient: Vivi Hernandez   YOB: 1965   Date of Visit: 4/22/2021       Dear Dr Ana Laura Willis:    Thank you for referring Thalia Hernández to me for evaluation  Below are my notes for this consultation  If you have questions, please do not hesitate to call me  I look forward to following your patient along with you  Sincerely,        Tanya Carrera PA-C        CC: No Recipients  Tanya Carrera [place]  4/22/2021 11:35 AM  Sign when Signing Visit  [facility] - Outpatient Consultation  Vivi Hernandez 60 y o  male MRN: [id number]  Encounter: [id number]          ASSESSMENT AND PLAN:      1  Diarrhea, unspecified type  -Will plan to update laboratories to include thyroid function testing, sedimentation rate, CRP, celiac profile     -Will plan colonoscopy with biopsy to rule out microscopic colitis  -Patient will start taking fiber supplementation daily     -Patient will continue acidophilus     -Patient will have a follow-up appointment after all testing is complete   ______________________________________________________________________    HPI:   33-year-old male who presents to the office today with a chief complaint of diarrhea  Patient reports he has been suffering from chronic diarrhea now for the past 2 and half months  Patient reports that he most recently did have stool cultures performed that were negative for bacteria  Patient reports that he does eat multiple times throughout the course of the day and he has always done this  Patient is now reporting 5-10 cm the stools a day  He denies any rectal bleeding or melena  Patient's last colonoscopy was in 2013 and this was a normal examination  Patient reports that his weight is stable and he is eating very well  He denies any of oral pain or rectal pain  He denies any nausea or vomiting    Patient denies any family history of Crohn's disease or ulcerative colitis  Patient's last CBC was normal     REVIEW OF SYSTEMS:    CONSTITUTIONAL: Denies any fever, chills, rigors, and weight loss  HEENT: No earache or tinnitus  Denies hearing loss or visual disturbances  CARDIOVASCULAR: No chest pain or palpitations  RESPIRATORY: Denies any cough, hemoptysis, shortness of breath or dyspnea on exertion  GASTROINTESTINAL: As noted in the History of Present Illness  GENITOURINARY: No problems with urination  Denies any hematuria or dysuria  NEUROLOGIC: No dizziness or vertigo, denies headaches  MUSCULOSKELETAL: Denies any muscle or joint pain  SKIN: Denies skin rashes or itching  ENDOCRINE: Denies excessive thirst  Denies intolerance to heat or cold  PSYCHOSOCIAL: Denies depression or anxiety  Denies any recent memory loss         Historical Information   Past Medical History:   Diagnosis Date    Substance abuse [facility])      Past Surgical History:   Procedure Laterality Date    CARPAL TUNNEL RELEASE Right 12/2020     Social History   Social History     Substance and Sexual Activity   Alcohol Use Yes    Frequency: 4 or more times a week    Drinks per session: 10 or more    Comment: rufino rosas drinking daily about a "handle" of vodka straight      Social History     Substance and Sexual Activity   Drug Use Not Currently     Social History     Tobacco Use   Smoking Status Current Every Day Smoker    Packs/day: 1 00   Smokeless Tobacco Never Used     Family History   Problem Relation Age of Onset    No Known Problems Mother     Cancer Father        Meds/Allergies       Current Outpatient Medications:     albuterol (PROVENTIL HFA,VENTOLIN HFA) 90 mcg/act inhaler    cholestyramine light (PREVALITE) 4 GM/DOSE powder    dicyclomine (BENTYL) 10 mg capsule    hydrOXYzine pamoate (VISTARIL) 25 mg capsule    metroNIDAZOLE (FLAGYL) 500 mg tablet    naltrexone (REVIA) 50 mg tablet    escitalopram (LEXAPRO) 10 mg tablet   escitalopram (LEXAPRO) 5 mg tablet    gabapentin (NEURONTIN) 300 mg capsule    lidocaine (LIDODERM) 5 %    mirtazapine (REMERON) 15 mg tablet    nicotine (NICODERM CQ) 21 mg/24 hr TD 24 hr patch    Allergies   Allergen Reactions    Pollen Extract Other (See Comments)           Objective     Blood pressure 100/70, pulse 68, height 5' 8" (1 727 m), weight 58 2 kg (128 lb 6 4 oz)  Body mass index is 19 52 kg/m²  PHYSICAL EXAM:      General Appearance:   Alert, cooperative, no distress   HEENT:   Normocephalic, atraumatic, anicteric      Neck:  Supple, symmetrical, trachea midline   Lungs:   Clear to auscultation bilaterally; no rales, rhonchi or wheezing; respirations unlabored    Heart[de-identified]   Regular rate and rhythm; no murmur, rub, or gallop  Abdomen:   Soft, non-tender, non-distended; normal bowel sounds; no masses, no organomegaly    Genitalia:   Deferred    Rectal:   Deferred    Extremities:  No cyanosis, clubbing or edema    Pulses:  2+ and symmetric    Skin:  No jaundice, rashes, or lesions    Lymph nodes:  No palpable cervical lymphadenopathy        Lab Results:   No visits with results within 1 Day(s) from this visit     Latest known visit with results is:   Admission on 01/17/2021, Discharged on 01/19/2021   Component Date Value    Amph/Meth UR 01/17/2021 Negative     Barbiturate Ur 01/17/2021 Negative     Benzodiazepine Urine 01/17/2021 Negative     Cocaine Urine 01/17/2021 Negative     Methadone Urine 01/17/2021 Negative     Opiate Urine 01/17/2021 Negative     PCP Ur 01/17/2021 Negative     THC Urine 01/17/2021 Negative     Oxycodone Urine 01/17/2021 Negative     EXTBreath Alcohol 01/17/2021 Unable to obtain     Ethanol Lvl 01/17/2021 380*    SARS-CoV-2 01/18/2021 Negative     INFLUENZA A PCR 01/18/2021 Negative     INFLUENZA B PCR 01/18/2021 Negative     RSV PCR 01/18/2021 Negative     WBC 01/18/2021 2 89*    RBC 01/18/2021 4 53     Hemoglobin 01/18/2021 14 9     Hematocrit 01/18/2021 42 2     MCV 01/18/2021 93     MCH 01/18/2021 32 9     MCHC 01/18/2021 35 3     RDW 01/18/2021 12 9     MPV 01/18/2021 9 9     Platelets 49/58/1060 94*    nRBC 01/18/2021 0     Neutrophils Relative 01/18/2021 65     Immat GRANS % 01/18/2021 0     Lymphocytes Relative 01/18/2021 19     Monocytes Relative 01/18/2021 13*    Eosinophils Relative 01/18/2021 1     Basophils Relative 01/18/2021 2*    Neutrophils Absolute 01/18/2021 1 91     Immature Grans Absolute 01/18/2021 0 00     Lymphocytes Absolute 01/18/2021 0 55*    Monocytes Absolute 01/18/2021 0 36     Eosinophils Absolute 01/18/2021 0 02     Basophils Absolute 01/18/2021 0 05     Sodium 01/18/2021 140     Potassium 01/18/2021 4 1     Chloride 01/18/2021 101     CO2 01/18/2021 26     ANION GAP 01/18/2021 13     BUN 01/18/2021 13     Creatinine 01/18/2021 0 73     Glucose 01/18/2021 97     Calcium 01/18/2021 9 3     eGFR 01/18/2021 103     TSH 3RD GENERATON 01/18/2021 0 774     Color, UA 01/18/2021 Yellow     Clarity, UA 01/18/2021 Clear     Specific Gravity, UA 01/18/2021 1 020     pH, UA 01/18/2021 7 0     Leukocytes, UA 01/18/2021 Negative     Nitrite, UA 01/18/2021 Negative     Protein, UA 01/18/2021 Negative     Glucose, UA 01/18/2021 Negative     Ketones, UA 01/18/2021 >=80 (3+)*    Urobilinogen, UA 01/18/2021 1 0     Bilirubin, UA 01/18/2021 Negative     Blood, UA 01/18/2021 Negative     Ventricular Rate 01/18/2021 113     Atrial Rate 01/18/2021 113     MN Interval 01/18/2021 130     QRSD Interval 01/18/2021 92     QT Interval 01/18/2021 354     QTC Interval 01/18/2021 485     P Axis 01/18/2021 69     QRS Axis 01/18/2021 73     T Wave Axis 01/18/2021 55          Radiology Results:   No results found

## 2021-05-07 ENCOUNTER — TELEPHONE (OUTPATIENT)
Dept: GASTROENTEROLOGY | Facility: CLINIC | Age: 56
End: 2021-05-07

## 2021-05-13 ENCOUNTER — ANESTHESIA EVENT (OUTPATIENT)
Dept: GASTROENTEROLOGY | Facility: HOSPITAL | Age: 56
End: 2021-05-13

## 2021-05-13 ENCOUNTER — ANESTHESIA (OUTPATIENT)
Dept: GASTROENTEROLOGY | Facility: HOSPITAL | Age: 56
End: 2021-05-13

## 2021-05-13 ENCOUNTER — HOSPITAL ENCOUNTER (OUTPATIENT)
Dept: GASTROENTEROLOGY | Facility: HOSPITAL | Age: 56
Setting detail: OUTPATIENT SURGERY
Discharge: HOME | End: 2021-05-13
Attending: INTERNAL MEDICINE
Payer: COMMERCIAL

## 2021-05-13 VITALS
RESPIRATION RATE: 16 BRPM | SYSTOLIC BLOOD PRESSURE: 89 MMHG | TEMPERATURE: 97.3 F | HEIGHT: 67 IN | OXYGEN SATURATION: 99 % | DIASTOLIC BLOOD PRESSURE: 54 MMHG | HEART RATE: 62 BPM | BODY MASS INDEX: 19.52 KG/M2 | WEIGHT: 124.34 LBS

## 2021-05-13 DIAGNOSIS — R19.7 DIARRHEA, UNSPECIFIED TYPE: ICD-10-CM

## 2021-05-13 PROCEDURE — 45380 COLONOSCOPY AND BIOPSY: CPT | Performed by: INTERNAL MEDICINE

## 2021-05-13 PROCEDURE — 88305 TISSUE EXAM BY PATHOLOGIST: CPT | Performed by: PATHOLOGY

## 2021-05-13 RX ORDER — LIDOCAINE HYDROCHLORIDE 10 MG/ML
INJECTION, SOLUTION EPIDURAL; INFILTRATION; INTRACAUDAL; PERINEURAL AS NEEDED
Status: DISCONTINUED | OUTPATIENT
Start: 2021-05-13 | End: 2021-05-13

## 2021-05-13 RX ORDER — SODIUM CHLORIDE, SODIUM LACTATE, POTASSIUM CHLORIDE, CALCIUM CHLORIDE 600; 310; 30; 20 MG/100ML; MG/100ML; MG/100ML; MG/100ML
125 INJECTION, SOLUTION INTRAVENOUS CONTINUOUS
Status: DISCONTINUED | OUTPATIENT
Start: 2021-05-13 | End: 2021-05-17 | Stop reason: HOSPADM

## 2021-05-13 RX ORDER — PROPOFOL 10 MG/ML
INJECTION, EMULSION INTRAVENOUS AS NEEDED
Status: DISCONTINUED | OUTPATIENT
Start: 2021-05-13 | End: 2021-05-13

## 2021-05-13 RX ADMIN — LIDOCAINE HYDROCHLORIDE 50 MG: 10 INJECTION, SOLUTION EPIDURAL; INFILTRATION; INTRACAUDAL; PERINEURAL at 08:57

## 2021-05-13 RX ADMIN — PROPOFOL 100 MG: 10 INJECTION, EMULSION INTRAVENOUS at 08:57

## 2021-05-13 RX ADMIN — PROPOFOL 20 MG: 10 INJECTION, EMULSION INTRAVENOUS at 09:03

## 2021-05-13 RX ADMIN — PHENYLEPHRINE HYDROCHLORIDE 100 MCG: 10 INJECTION INTRAVENOUS at 09:01

## 2021-05-13 RX ADMIN — PHENYLEPHRINE HYDROCHLORIDE 100 MCG: 10 INJECTION INTRAVENOUS at 09:06

## 2021-05-13 RX ADMIN — PHENYLEPHRINE HYDROCHLORIDE 100 MCG: 10 INJECTION INTRAVENOUS at 08:57

## 2021-05-13 RX ADMIN — PROPOFOL 30 MG: 10 INJECTION, EMULSION INTRAVENOUS at 08:59

## 2021-05-13 RX ADMIN — SODIUM CHLORIDE, SODIUM LACTATE, POTASSIUM CHLORIDE, AND CALCIUM CHLORIDE 125 ML/HR: .6; .31; .03; .02 INJECTION, SOLUTION INTRAVENOUS at 07:52

## 2021-05-13 RX ADMIN — PROPOFOL 20 MG: 10 INJECTION, EMULSION INTRAVENOUS at 09:05

## 2021-05-13 RX ADMIN — PROPOFOL 20 MG: 10 INJECTION, EMULSION INTRAVENOUS at 09:00

## 2021-05-13 NOTE — ANESTHESIA POSTPROCEDURE EVALUATION
Post-Op Assessment Note    CV Status:  Stable  Pain Score: 0    Pain management: inadequate     Mental Status:  Sleepy   Hydration Status:  Stable   PONV Controlled:  Controlled   Airway Patency:  Patent and adequate   Two or more mitigation strategies used for obstructive sleep apnea   Post Op Vitals Reviewed: Yes      Staff: CRNA         No complications documented      BP      Temp     Pulse     Resp      SpO2

## 2021-05-13 NOTE — ANESTHESIA PREPROCEDURE EVALUATION
Procedure:  COLONOSCOPY    Relevant Problems   NEURO/PSYCH   (+) MDD (major depressive disorder), recurrent episode, moderate (HCC)        Physical Exam    Airway    Mallampati score: II  TM Distance: >3 FB  Neck ROM: full     Dental       Cardiovascular  Rhythm: regular, Rate: normal, Cardiovascular exam normal    Pulmonary  Pulmonary exam normal Breath sounds clear to auscultation,     Other Findings  Missing teeth, poor dentition      Anesthesia Plan  ASA Score- 2     Anesthesia Type- IV sedation with anesthesia with ASA Monitors  Additional Monitors:   Airway Plan:           Plan Factors-Exercise tolerance (METS): >4 METS  Chart reviewed  Patient is a current smoker  Patient instructed to abstain from smoking on day of procedure  Patient did not smoke on day of surgery  There is medical exclusion for perioperative obstructive sleep apnea risk education  Induction- intravenous  Postoperative Plan-     Informed Consent- Anesthetic plan and risks discussed with patient  I personally reviewed this patient with the CRNA  Discussed and agreed on the Anesthesia Plan with the CRNA  Jayesh Paredes

## 2021-05-13 NOTE — H&P
History and Physical -  [facility]  Cyrus Bob 60 y o  male MRN: [id number]      HPI: Cyrus Bob is a 60y o  year old male who presents for diarrhea complaint      REVIEW OF SYSTEMS: Per the HPI, and otherwise unremarkable  Historical Information   Past Medical History:   Diagnosis Date    [facility] [address] )     [facility]      Past Surgical History:   Procedure Laterality Date    CARPAL TUNNEL RELEASE Right 12/2020    HERNIA REPAIR      x2 when young   [place] WISDOM TOOTH EXTRACTION       Social History   Social History     Substance and Sexual Activity   Alcohol Use Not Currently    Frequency: 4 or more times a week    Drinks per session: 10 or more    Comment: patinet reprots drinking daily about a "handle" of vodka straight      Social History     Substance and Sexual Activity   Drug Use Not Currently     Social History     Tobacco Use   Smoking Status Current Every Day Smoker    Packs/day: 1 00   Smokeless Tobacco Never Used     Family History   Problem Relation Age of Onset    No Known Problems Mother     Cancer Father        Meds/Allergies     (Not in a hospital admission)      Allergies   Allergen Reactions    Pollen Extract Other (See Comments)       Objective     Blood pressure 112/76, pulse 72, temperature (!) 97 3 °F (36 3 °C), temperature source Temporal, resp  rate 12, height 5' 7" (1 702 m), weight 56 4 kg (124 lb 5 4 oz), SpO2 98 %  PHYSICAL EXAM    Gen: NAD  CV: RRR  CHEST: Clear  ABD: soft, NT/ND  EXT: no edema      ASSESSMENT/PLAN:  This is a 60y o  year old male here for colonoscopy with biopsies, and he is stable and optimized for his procedure

## 2021-05-13 NOTE — DISCHARGE INSTRUCTIONS
Colonoscopy   WHAT YOU NEED TO KNOW:   A colonoscopy is a procedure to examine the inside of your colon (intestine) with a scope  Polyps or tissue growths may have been removed during your colonoscopy  It is normal to feel bloated and to have some abdominal discomfort  You should be passing gas  If you have hemorrhoids or you had polyps removed, you may have a small amount of bleeding  DISCHARGE INSTRUCTIONS:   Seek care immediately if:   · You have a large amount of bright red blood in your bowel movements  · Your abdomen is hard and firm and you have severe pain  · You have sudden trouble breathing  Contact your healthcare provider if:   · You develop a rash or hives  · You have a fever within 24 hours of your procedure       · You have not had a bowel movement for 3 days after your procedure  · You have questions or concerns about your condition or care  Activity:   · Do not lift, strain, or run  for 3 days after your procedure  · Rest after your procedure  You have been given medicine to relax you  Do not  drive or make important decisions until the day after your procedure  Return to your normal activity as directed  · Relieve gas and discomfort from bloating  by lying on your right side with a heating pad on your abdomen  You may need to take short walks to help the gas move out  Eat small meals until bloating is relieved  If you had polyps removed: For 7 days after your procedure:  · Do not  take aspirin  · Do not  go on long car rides  Follow up with your healthcare provider as directed:  Write down your questions so you remember to ask them during your visits  © 2017 [address] is for End User's use only and may not be sold, redistributed or otherwise used for commercial purposes  All illustrations and images included in CareNotes® are the copyrighted property of [employer]  or Joel Palacios    The above information is an  only  It is not intended as medical advice for individual conditions or treatments  Talk to your doctor, nurse or pharmacist before following any medical regimen to see if it is safe and effective for you

## 2021-05-18 ENCOUNTER — TELEPHONE (OUTPATIENT)
Dept: GASTROENTEROLOGY | Facility: CLINIC | Age: 56
End: 2021-05-18

## 2021-06-23 ENCOUNTER — OFFICE VISIT (OUTPATIENT)
Dept: GASTROENTEROLOGY | Facility: CLINIC | Age: 56
End: 2021-06-23
Payer: COMMERCIAL

## 2021-06-23 VITALS
DIASTOLIC BLOOD PRESSURE: 68 MMHG | BODY MASS INDEX: 20.75 KG/M2 | HEART RATE: 72 BPM | HEIGHT: 67 IN | WEIGHT: 132.2 LBS | SYSTOLIC BLOOD PRESSURE: 98 MMHG

## 2021-06-23 DIAGNOSIS — R19.7 DIARRHEA, UNSPECIFIED TYPE: Primary | ICD-10-CM

## 2021-06-23 PROCEDURE — 99213 OFFICE O/P EST LOW 20 MIN: CPT | Performed by: PHYSICIAN ASSISTANT

## 2021-06-23 RX ORDER — CELECOXIB 200 MG/1
CAPSULE ORAL
COMMUNITY
Start: 2021-06-19

## 2021-06-23 NOTE — PROGRESS NOTES
[facility] - Outpatient Follow-up Note  Erwin Osuna 60 y o  male MRN: [id number]  Encounter: [id number]          ASSESSMENT AND PLAN:      1  Diarrhea, unspecified type  -Will start fiber daily  -Will continue probiotics   -Will start Viberzi 75mg daily    -Patient will start to cut back on dietary sugars   -High fiber diet recommended   -Labs will be re-printed  -Follow up in 1 month    ______________________________________________________________________    SUBJECTIVE:   61-year-old male presents for follow-up after his colonoscopy  Patient's colonoscopy was a normal examination  Patient's biopsies were negative for microscopic colitis  Patient is still reporting 5-6 bowel movements a day  He denies any melena or rectal bleeding patient denies any abdominal pain, nausea, vomiting  He reports he is not taking fiber consistently  He reports he is taking daily probiotic  He reports that his appetite is good  His mom reports that the office visit today the patient has been eating a significant amount of sugar more so than he ever did  REVIEW OF SYSTEMS IS OTHERWISE NEGATIVE        Historical Information   Past Medical History:   Diagnosis Date    Pulmonary emphysema ([facility] [address] )     [facility] ([facility] )      Past Surgical History:   Procedure Laterality Date    CARPAL TUNNEL RELEASE Right 12/2020    HERNIA REPAIR      x2 when young   Clifm Lucas WISDOM TOOTH EXTRACTION       Social History   Social History     Substance and Sexual Activity   Alcohol Use Not Currently    Comment: patinet reprots drinking daily about a "handle" of vodka straight      Social History     Substance and Sexual Activity   Drug Use Not Currently     Social History     Tobacco Use   Smoking Status Current Every Day Smoker    Packs/day: 1 00   Smokeless Tobacco Never Used     Family History   Problem Relation Age of Onset    No Known Problems Mother     Cancer Father        Meds/Allergies       Current Outpatient Medications:     albuterol (PROVENTIL HFA,VENTOLIN HFA) 90 mcg/act inhaler    celecoxib (CeleBREX) 200 mg capsule    hydrOXYzine pamoate (VISTARIL) 25 mg capsule    metroNIDAZOLE (FLAGYL) 500 mg tablet    naltrexone (REVIA) 50 mg tablet    escitalopram (LEXAPRO) 10 mg tablet    escitalopram (LEXAPRO) 5 mg tablet    gabapentin (NEURONTIN) 300 mg capsule    lidocaine (LIDODERM) 5 %    mirtazapine (REMERON) 15 mg tablet    nicotine (NICODERM CQ) 21 mg/24 hr TD 24 hr patch    Allergies   Allergen Reactions    Pollen Extract Other (See Comments)           Objective     Blood pressure 98/68, pulse 72, height 5' 7" (1 702 m), weight 60 kg (132 lb 3 2 oz)  Body mass index is 20 71 kg/m²  PHYSICAL EXAM:      General Appearance:   Alert, cooperative, no distress   HEENT:   Normocephalic, atraumatic, anicteric      Neck:  Supple, symmetrical, trachea midline   Lungs:   Clear to auscultation bilaterally; no rales, rhonchi or wheezing; respirations unlabored    Heart[de-identified]   Regular rate and rhythm; no murmur, rub, or gallop  Abdomen:   Soft, non-tender, non-distended; normal bowel sounds; no masses, no organomegaly    Genitalia:   Deferred    Rectal:   Deferred    Extremities:  No cyanosis, clubbing or edema    Pulses:  2+ and symmetric    Skin:  No jaundice, rashes, or lesions    Lymph nodes:  No palpable cervical lymphadenopathy        Lab Results:   No visits with results within 1 Day(s) from this visit     Latest known visit with results is:   Hospital Outpatient Visit on 05/13/2021   Component Date Value    Case Report 05/13/2021                      Value:Surgical Pathology Report                         Case: [id number]                                   Authorizing Provider:  Jeremy Masters DO          Collected:           05/13/2021 2165              Ordering Location:      [facility]       Received:            05/13/2021 [id number] [facility] Endoscopy Pathologist:           Rhina Vivar MD                                                         Specimen:    Colon, acsending , sigmoid                                                                 Final Diagnosis 05/13/2021                      Value: This result contains rich text formatting which cannot be displayed here   Additional Information 05/13/2021                      Value: This result contains rich text formatting which cannot be displayed here  Regan Pert Gross Description 05/13/2021                      Value: This result contains rich text formatting which cannot be displayed here   Clinical Information 05/13/2021                      Value:Cold bx r/o micro colitis         Radiology Results:   No results found

## 2021-06-25 ENCOUNTER — TELEPHONE (OUTPATIENT)
Dept: GASTROENTEROLOGY | Facility: CLINIC | Age: 56
End: 2021-06-25

## 2021-06-25 NOTE — TELEPHONE ENCOUNTER
Patient would like the most recent lab order emailed to him  Julito Queen He uses [employer]     Any questions please phone [phone]

## 2021-06-29 NOTE — TELEPHONE ENCOUNTER
Zuleyka patient -  Patient's mom called again asking that the bloodwork orders be sent to [facility] as that is what he uses  Please RTRN call to [phone] to get FAX # for the script    Thx

## 2021-06-30 NOTE — TELEPHONE ENCOUNTER
Called and spoke to mother, stated did not get e mail  Quentin Kaplan fax number   And mother requested to e mail again  Emailed to [email or web address] and faxed as requested

## 2021-07-13 ENCOUNTER — APPOINTMENT (OUTPATIENT)
Dept: LAB | Facility: CLINIC | Age: 56
End: 2021-07-13
Payer: COMMERCIAL

## 2021-07-13 DIAGNOSIS — R19.7 DIARRHEA, UNSPECIFIED TYPE: ICD-10-CM

## 2021-07-13 LAB
CRP SERPL QL: <3 MG/L
ERYTHROCYTE [SEDIMENTATION RATE] IN BLOOD: 13 MM/HOUR (ref 0–19)
TSH SERPL DL<=0.05 MIU/L-ACNC: 0.67 UIU/ML (ref 0.36–3.74)

## 2021-07-13 PROCEDURE — 85652 RBC SED RATE AUTOMATED: CPT

## 2021-07-13 PROCEDURE — 36415 COLL VENOUS BLD VENIPUNCTURE: CPT

## 2021-07-13 PROCEDURE — 86140 C-REACTIVE PROTEIN: CPT

## 2021-07-13 PROCEDURE — 83516 IMMUNOASSAY NONANTIBODY: CPT

## 2021-07-13 PROCEDURE — 82784 ASSAY IGA/IGD/IGG/IGM EACH: CPT

## 2021-07-13 PROCEDURE — 84443 ASSAY THYROID STIM HORMONE: CPT

## 2021-07-13 PROCEDURE — 86255 FLUORESCENT ANTIBODY SCREEN: CPT

## 2021-07-14 LAB
ENDOMYSIUM IGA SER QL: NEGATIVE
GLIADIN PEPTIDE IGA SER-ACNC: 3 UNITS (ref 0–19)
GLIADIN PEPTIDE IGG SER-ACNC: 2 UNITS (ref 0–19)
IGA SERPL-MCNC: 177 MG/DL (ref 90–386)
TTG IGA SER-ACNC: 2 U/ML (ref 0–3)
TTG IGG SER-ACNC: 4 U/ML (ref 0–5)

## 2021-08-02 RX ORDER — ESCITALOPRAM OXALATE 20 MG/1
TABLET ORAL
COMMUNITY
Start: 2021-07-05

## 2021-08-03 ENCOUNTER — OFFICE VISIT (OUTPATIENT)
Dept: GASTROENTEROLOGY | Facility: CLINIC | Age: 56
End: 2021-08-03
Payer: COMMERCIAL

## 2021-08-03 VITALS
WEIGHT: 131.8 LBS | BODY MASS INDEX: 20.69 KG/M2 | HEIGHT: 67 IN | DIASTOLIC BLOOD PRESSURE: 78 MMHG | SYSTOLIC BLOOD PRESSURE: 104 MMHG | HEART RATE: 77 BPM

## 2021-08-03 DIAGNOSIS — K58.0 IRRITABLE BOWEL SYNDROME WITH DIARRHEA: Primary | ICD-10-CM

## 2021-08-03 PROCEDURE — 99213 OFFICE O/P EST LOW 20 MIN: CPT | Performed by: PHYSICIAN ASSISTANT

## 2021-08-03 NOTE — LETTER
August 3, 2021     Katerin Hay MD  [address]  [address]  [facility] [address]    Patient: Priscila Altamirano   YOB: 1965   Date of Visit: 8/3/2021       Dear Dr Jaonna Yoon:    Thank you for referring Shu Grover to me for evaluation  Below are my notes for this consultation  If you have questions, please do not hesitate to call me  I look forward to following your patient along with you  Sincerely,        Kena Ramos PA-C        CC: No Recipients  Barry Son  8/3/2021  9:38 AM  Sign when Signing Visit  [facility] - Outpatient Follow-up Note  Priscila Altamirano 60 y o  male MRN: [id number]  Encounter: [id number]          ASSESSMENT AND PLAN:      1  Irritable bowel syndrome with diarrhea  -Will continue Viberzi but 100 mg tablets  Samples provided for 3 weeks   -Patient will call in 2 weeks with an update     -Continue fiber supplementation and probiotic daily     -Patient will follow-up in 3 months   _____________________________________________________________________    SUBJECTIVE:   47-year-old male who presents for follow-up of irritable bowel syndrome diarrhea predominance  Patient's last appointment we recommended that he start taking Viberzi 75 mg  Patient reports that he did take this and it did seem to help form his stools and decreased urgency  Patient is still reporting 3-4 bowel movements a day  Patient denies any abdominal pain  Patient denies any melena or rectal bleeding  Patient does eat a very well-balanced diet  Patient is currently on fiber and probiotic  Patient continues to abstain from alcohol  Patient denies any nausea or vomiting  REVIEW OF SYSTEMS IS OTHERWISE NEGATIVE        Historical Information   Past Medical History:   Diagnosis Date    [facility] emph[facility]sema ([facility] [address] )     Substance abuse ([facility] )      Past Surgical History:   Procedure Laterality Date    CARPAL TUNNEL RELEASE Right 12/2020    HERNIA REPAIR      x2 when young   [address] History   Social History     Substance and Sexual Activity   Alcohol Use Not Currently    Comment: patinet reprots drinking daily about a "handle" of vodka straight      Social History     Substance and Sexual Activity   Drug Use Not Currently     Social History     Tobacco Use   Smoking Status Current Every Day Smoker    Packs/day: 1 00   Smokeless Tobacco Never Used     Family History   Problem Relation Age of Onset    No Known Problems Mother     Cancer Father        Meds/Allergies       Current Outpatient Medications:     albuterol (PROVENTIL HFA,VENTOLIN HFA) 90 mcg/act inhaler    celecoxib (CeleBREX) 200 mg capsule    escitalopram (LEXAPRO) 20 mg tablet    hydrOXYzine pamoate (VISTARIL) 25 mg capsule    metroNIDAZOLE (FLAGYL) 500 mg tablet    naltrexone (REVIA) 50 mg tablet    escitalopram (LEXAPRO) 10 mg tablet    escitalopram (LEXAPRO) 5 mg tablet    gabapentin (NEURONTIN) 300 mg capsule    lidocaine (LIDODERM) 5 %    mirtazapine (REMERON) 15 mg tablet    nicotine (NICODERM CQ) 21 mg/24 hr TD 24 hr patch    Allergies   Allergen Reactions    Pollen Extract Other (See Comments)           Objective     Blood pressure 104/78, pulse 77, height 5' 7" (1 702 m), weight 59 8 kg (131 lb 12 8 oz)  Body mass index is 20 64 kg/m²  PHYSICAL EXAM:      General Appearance:   Alert, cooperative, no distress   HEENT:   Normocephalic, atraumatic, anicteric      Neck:  Supple, symmetrical, trachea midline   Lungs:   Clear to auscultation bilaterally; no rales, rhonchi or wheezing; respirations unlabored    Heart[de-identified]   Regular rate and rhythm; no murmur, rub, or gallop     Abdomen:   Soft, non-tender, non-distended; normal bowel sounds; no masses, no organomegaly    Genitalia:   Deferred    Rectal:   Deferred    Extremities:  No cyanosis, clubbing or edema    Pulses:  2+ and symmetric    Skin:  No jaundice, rashes, or lesions    Lymph nodes:  No palpable cervical lymphadenopathy        Lab Results:   No visits with results within 1 Day(s) from this visit  Latest known visit with results is:   Appointment on 07/13/2021   Component Date Value    IgA 07/13/2021 177     Gliadin IgA 07/13/2021 3     Gliadin IgG 07/13/2021 2     Tissue Transglut Ab IGG 07/13/2021 4     TISSUE TRANSGLUTAMINASE * 07/13/2021 2     Endomysial IgA 07/13/2021 Negative     Sed Rate 07/13/2021 13     CRP 07/13/2021 <3 0     TSH 3RD GENERATON 07/13/2021 0 668          Radiology Results:   No results found

## 2021-08-03 NOTE — PROGRESS NOTES
[facility] - Outpatient Follow-up Note  Nella Becerra 60 y o  male MRN: [id number]  Encounter: [id number]          ASSESSMENT AND PLAN:      1  Irritable bowel syndrome with diarrhea  -Will continue Viberzi but 100 mg tablets  Samples provided for 3 weeks   -Patient will call in 2 weeks with an update     -Continue fiber supplementation and probiotic daily     -Patient will follow-up in 3 months   _____________________________________________________________________    SUBJECTIVE:   49-year-old male who presents for follow-up of irritable bowel syndrome diarrhea predominance  Patient's last appointment we recommended that he start taking Viberzi 75 mg  Patient reports that he did take this and it did seem to help form his stools and decreased urgency  Patient is still reporting 3-4 bowel movements a day  Patient denies any abdominal pain  Patient denies any melena or rectal bleeding  Patient does eat a very well-balanced diet  Patient is currently on fiber and probiotic  Patient continues to abstain from alcohol  Patient denies any nausea or vomiting  REVIEW OF SYSTEMS IS OTHERWISE NEGATIVE        Historical Information   Past Medical History:   Diagnosis Date    [facility]ma ([facility] [address] )     Substance abuse ([facility] )      Past Surgical History:   Procedure Laterality Date    CARPAL TUNNEL RELEASE Right 12/2020    HERNIA REPAIR      x2 when young   [place] WISDOM TOOTH EXTRACTION       Social History   Social History     Substance and Sexual Activity   Alcohol Use Not Currently    Comment: patinet reprots drinking daily about a "handle" of vodka straight      Social History     Substance and Sexual Activity   Drug Use Not Currently     Social History     Tobacco Use   Smoking Status Current Every Day Smoker    Packs/day: 1 00   Smokeless Tobacco Never Used     Family History   Problem Relation Age of Onset    No Known Problems Mother     Cancer Father Meds/Allergies       Current Outpatient Medications:     albuterol (PROVENTIL HFA,VENTOLIN HFA) 90 mcg/act inhaler    celecoxib (CeleBREX) 200 mg capsule    escitalopram (LEXAPRO) 20 mg tablet    hydrOXYzine pamoate (VISTARIL) 25 mg capsule    metroNIDAZOLE (FLAGYL) 500 mg tablet    naltrexone (REVIA) 50 mg tablet    escitalopram (LEXAPRO) 10 mg tablet    escitalopram (LEXAPRO) 5 mg tablet    gabapentin (NEURONTIN) 300 mg capsule    lidocaine (LIDODERM) 5 %    mirtazapine (REMERON) 15 mg tablet    nicotine (NICODERM CQ) 21 mg/24 hr TD 24 hr patch    Allergies   Allergen Reactions    Pollen Extract Other (See Comments)           Objective     Blood pressure 104/78, pulse 77, height 5' 7" (1 702 m), weight 59 8 kg (131 lb 12 8 oz)  Body mass index is 20 64 kg/m²  PHYSICAL EXAM:      General Appearance:   Alert, cooperative, no distress   HEENT:   Normocephalic, atraumatic, anicteric      Neck:  Supple, symmetrical, trachea midline   Lungs:   Clear to auscultation bilaterally; no rales, rhonchi or wheezing; respirations unlabored    Heart[de-identified]   Regular rate and rhythm; no murmur, rub, or gallop  Abdomen:   Soft, non-tender, non-distended; normal bowel sounds; no masses, no organomegaly    Genitalia:   Deferred    Rectal:   Deferred    Extremities:  No cyanosis, clubbing or edema    Pulses:  2+ and symmetric    Skin:  No jaundice, rashes, or lesions    Lymph nodes:  No palpable cervical lymphadenopathy        Lab Results:   No visits with results within 1 Day(s) from this visit     Latest known visit with results is:   Appointment on 07/13/2021   Component Date Value    IgA 07/13/2021 177     Gliadin IgA 07/13/2021 3     Gliadin IgG 07/13/2021 2     Tissue Transglut Ab IGG 07/13/2021 4     TISSUE TRANSGLUTAMINASE * 07/13/2021 2     Endomysial IgA 07/13/2021 Negative     Sed Rate 07/13/2021 13     CRP 07/13/2021 <3 0     TSH 3RD GENERATON 07/13/2021 0 668          Radiology Results:   No results found

## 2021-08-03 NOTE — PATIENT INSTRUCTIONS
Nutrition Tips for Relief of Diarrhea   WHAT YOU NEED TO KNOW:   There are diet changes you can make to help relieve or stop diarrhea  These changes include limiting or avoiding foods and liquids that are high in sugar, fat, fiber, and lactose  Lactose is a sugar found in milk products  Milk products can cause diarrhea in people who are lactose intolerant  You should also drink extra liquids to replace fluids that are lost when you have diarrhea  Diarrhea can lead to dehydration  DISCHARGE INSTRUCTIONS:   Foods to limit or avoid:   · Dairy:      ? Whole milk    ? Half-and-half, cream, and sour cream    ? Regular (whole milk) ice cream    · Grains:      ? Whole wheat and whole grain breads, pasta, cereals, and crackers    ? Artem Anuradha and wild rice    ? Breads and cereals with seeds or nuts    ? Popcorn    · Fruit and vegetables:      ? All raw fruits, except bananas and melon    ? Dried fruits, including prunes and raisins    ? Canned fruit in heavy syrup    ? Prune juice and any fruit juice with pulp    ? Raw vegetables, except lettuce     ? Fried vegetables    ? Corn, raw and cooked broccoli, cabbage, cauliflower, and christiano greens    · Protein:      ? Fried meat, poultry, and fish    ? High-fat luncheon meats, such as bologna    ? Fatty meats, such as sausage, ruiz, and hot dogs    ? Beans and nuts    · Liquids:      ? Sodas and fruit-flavored drinks    ? Drinks that contain caffeine, such as energy drinks, coffee, and tea     ? Drinks that contain alcohol or sugar alcohol, such as sorbitol    Foods and liquids you may eat or drink:  Most people can tolerate the foods and liquids listed below  If any of them make your symptoms worse, stop eating or drinking them until you feel better  If you are lactose intolerant, avoid milk products  · Dairy:      ? Skim or low-fat milk or evaporated milk    ? Soy milk or buttermilk     ? Low-fat, part-skim, and aged cheese    ?  Yogurt, low-fat ice cream, or sherbert    · Grains:  (Choose foods with less than 2 grams of dietary fiber per serving )     ? White or refined flour breads, bagels, pasta, and crackers    ? Cold or hot cereals made from white or refined flour such as puffed rice, cornflakes, or cream of wheat    ? White rice    · Fruit and vegetables:      ? Bananas or melon    ? Fruit juice without pulp, except prune juice    ? Canned fruit in juice or light syrup    ? Lettuce and most well-cooked vegetables without seeds or skins     ? Strained vegetable juice    · Protein:      ? Tender, well-cooked meat, poultry, or fish    ? Well-cooked eggs or soy foods (cooked without added fat)    ? Smooth nut butters    · Fats:  (Limit fats to less than 8 teaspoons a day)     ? Oil, butter, or margarine, or mayonnaise    ? Cream cheese or salad dressings    · Liquids:      ? For infants, breast milk or formula    ? Oral rehydration solution     ? Decaffeinated coffee or caffeine-free teas    ? Soft drinks without caffeine    Other guidelines to follow:   · Drink liquids as directed  You may need to drink more liquids than usual to prevent dehydration  Ask how much liquid to drink each day and which liquids are best for you  You may need to drink an oral rehydration solution (ORS)  An ORS helps replace fluids and electrolytes that you lose when you have diarrhea  · Eat small meals or snacks every 3 to 4 hours  instead of large meals  Continue eating even if you still have diarrhea  Your diarrhea will continue for a few days but should gradually go away  © Copyright [employer] 2021 Information is for End User's use only and may not be sold, redistributed or otherwise used for commercial purposes  All illustrations and images included in CareNotes® are the copyrighted property of [employer]  or [facility]   The above information is an  only  It is not intended as medical advice for individual conditions or treatments   Talk to your doctor, nurse or pharmacist before following any medical regimen to see if it is safe and effective for you

## 2021-10-27 ENCOUNTER — OFFICE VISIT (OUTPATIENT)
Dept: GASTROENTEROLOGY | Facility: CLINIC | Age: 56
End: 2021-10-27
Payer: COMMERCIAL

## 2021-10-27 VITALS
DIASTOLIC BLOOD PRESSURE: 80 MMHG | BODY MASS INDEX: 20.4 KG/M2 | SYSTOLIC BLOOD PRESSURE: 110 MMHG | HEART RATE: 82 BPM | HEIGHT: 67 IN | WEIGHT: 130 LBS

## 2021-10-27 DIAGNOSIS — K58.0 IRRITABLE BOWEL SYNDROME WITH DIARRHEA: Primary | ICD-10-CM

## 2021-10-27 PROCEDURE — 99213 OFFICE O/P EST LOW 20 MIN: CPT | Performed by: PHYSICIAN ASSISTANT

## 2021-10-27 RX ORDER — MONTELUKAST SODIUM 4 MG/1
2 TABLET, CHEWABLE ORAL DAILY
Qty: 60 TABLET | Refills: 3 | Status: SHIPPED | OUTPATIENT
Start: 2021-10-27

## 2021-10-27 RX ORDER — DICLOFENAC SODIUM 75 MG/1
TABLET, DELAYED RELEASE ORAL
COMMUNITY
Start: 2021-10-13

## 2025-04-23 NOTE — PROGRESS NOTES
Name: Ben Reeves      : 1965      MRN: [id number]  Encounter Provider: Tonya Kiran DO  Encounter Date: 2025   Encounter department: [facility] [address]  :  Assessment & Plan  Myalgia  Patient is a 59-year-old male presenting for further evaluation of myalgias that have been going on since January.  Reports that his entire body muscles hurt and it is worse with physical activity.  Pain is worse at night.  Patient was given a Medrol dose pack which did not help with his symptoms at all.  No prolonged morning stiffness or joint pain.  On exam, patient has full muscle strength.  Previous workup shows negative EDY and YOAN testing.  Negative RF, CCP, LFTs, CK, inflammatory markers.  Given negative serologic workup, full muscle strength and range of motion of joints and no response to steroids, less likely inflammatory myositis or PMR.  Patient does have significant worsening shortness of breath with increased respiratory effort noticed on exam.  Patient also reports several days of left-sided chest pain.  Discussed with patient that he needs to be evaluated by PCP or the ER given worsening shortness of breath and chest pain.  Given extensive smoking history, should also undergo malignancy screening.  Overall, low suspicion for a systemic autoimmune process.  -Advised patient to be evaluated by PCP as soon as possible given worsening shortness of breath and chest pain.  Discussed with patient that if symptoms get worse and unable to be seen by PCP, should be evaluated in the ER       RTC as needed     Pertinent Medical History   Reviewed         History of Present Illness   Ben Reeves is a 59 y.o. male with a hx of depression who presents for further evaluation of myalgia.     HPI   He reports whole body muscle aches that are not associated with any joint pain or swelling since 2025.He has not tried anything over the counter to alleviate his pain. His pain is  greatly impacting is ADLs making it hard for him to dress in the morning and it is very difficult for him to engage in any activity; his gait has changed to slow small steps due to pain. The pain makes him feel nauseous but he has had no vomiting. Pain has also impacted his sleep which has caused him to be fatigued. He was given a medrol pack by his PCP and that did not improve his symptoms at all. He denies any rash, ulcers, weakness, urinary symptoms, change in appetite, and weight changes. Patient was prescribed a statin but he never took it.     Patient also notes that around 5 days ago he started to experience left-sided chest pain. The pain is sharper than his myalgias but has not changed in character since starting. The pain does not radiate anywhere. Patient also notes shallow breathing with intermittent huffing. He attributes his breathing to the muscle pain he experiences throughout his chest wall when taking a deep breath. He denies any shortness of breath, lightheadedness, and dizziness.     Patient has an extensive smoking history and continues to smoke daily. He has no family history of rheumatologic conditions.       Review of Systems  Complete ROS conducted as per HPI. In addition, denies:  Fever  Photosensitive rash  Sicca symptoms  Recurrent oral ulcers  Muscle weakness  Uveitis  Dactylitis  Chest pain  SOB  Pleurisy  Gross hematuria  Foamy urine  Raynaud's  Joint issues other than noted above    Current Outpatient Medications on File Prior to Visit   Medication Sig Dispense Refill    albuterol (PROVENTIL HFA,VENTOLIN HFA) 90 mcg/act inhaler Inhale 1 puff every 6 (six) hours as needed      baclofen 10 mg tablet Take 10 mg by mouth      celecoxib (CeleBREX) 200 mg capsule       Cholecalciferol 1.25 MG (18072 UT) capsule Take 1 capsule by mouth once a week      colestipol (COLESTID) 1 g tablet Take 2 tablets (2 g total) by mouth daily 60 tablet 3    diclofenac (VOLTAREN) 75 mg EC tablet        "escitalopram (LEXAPRO) 20 mg tablet       folic acid (FOLVITE) 1 mg tablet Take 1 mg by mouth daily      hydrOXYzine pamoate (VISTARIL) 25 mg capsule       metaxalone (SKELAXIN) 800 mg tablet Take 800 mg by mouth      methylPREDNISolone 4 MG tablet therapy pack Follow package directions      metroNIDAZOLE (FLAGYL) 500 mg tablet       naltrexone (REVIA) 50 mg tablet       rosuvastatin (CRESTOR) 10 MG tablet Take 10 mg by mouth daily      sulindac (CLINORIL) 200 MG tablet Take 200 mg by mouth 2 (two) times a day      thiamine (VITAMIN B1) 50 mg tablet Take 50 mg by mouth daily      vilazodone (VIIBRYD) 20 mg tablet       gabapentin (NEURONTIN) 300 mg capsule Take 1 capsule (300 mg total) by mouth 3 (three) times a day 90 capsule 0    lidocaine (LIDODERM) 5 % Apply 1 patch topically daily Remove & Discard patch within 12 hours or as directed by MD 30 patch 0    mirtazapine (REMERON) 15 mg tablet Take 1 tablet (15 mg total) by mouth daily at bedtime 30 tablet 0    nicotine (NICODERM CQ) 21 mg/24 hr TD 24 hr patch Place 1 patch on the skin daily at bedtime 30 patch 0     No current facility-administered medications on file prior to visit.      Social History     Tobacco Use    Smoking status: Every Day     Current packs/day: 1.00     Types: Cigarettes    Smokeless tobacco: Never   Vaping Use    Vaping status: Never Used   Substance and Sexual Activity    Alcohol use: Not Currently     Comment: patinet reprots drinking daily about a \"handle\" of vodka straight     Drug use: Not Currently    Sexual activity: Not Currently         Objective   /82 (BP Location: Left arm, Patient Position: Sitting, Cuff Size: Standard)   Pulse (!) 125   Temp 97.6 °F (36.4 °C) (Tympanic)   Ht 5' 7\" (1.702 m)   Wt 57 kg (125 lb 9.6 oz)   SpO2 96%   BMI 19.67 kg/m²     Physical Exam  General appearance: normal appearing, no acute distress  Skin: normal, no rashes  HEENT: normal, moist oropharynx, no nasal or oral ulcers  Lymph nodes: " no palpable adenopathy  Lungs: Increased respiratory effort, lungs clear to auscultation b/l   Heart: normal heart sounds, normal rate, normal rhythm,  Abdomen: soft, normal bowel sounds, no tenderness  Neurologic: no obvious neurological deficits   Extremities: no edema, warm and well perfused     Musculoskeletal Exam:   - Observation: no obvious joint abnormalities    - Palpation: no joint tenderness  - Synovitis: absent  - Joint effusions: absent  - ROM: intact throughout  - Muscle Strength: 5/5 throughout     Recent labs:  Lab Results   Component Value Date/Time    SODIUM 139 03/14/2025 11:01 AM    K 4.3 03/14/2025 11:01 AM    BUN 10 03/14/2025 11:01 AM    CREATININE 0.9 03/14/2025 11:01 AM    GLUC 81 03/14/2025 11:01 AM    CALCIUM 9.7 03/14/2025 11:01 AM    AST 16 03/14/2025 11:01 AM    ALT 14 03/14/2025 11:01 AM    ALB 4.7 03/14/2025 11:01 AM    TP 7 03/14/2025 11:01 AM    EGFR >90 03/14/2025 11:01 AM    EGFR >60.0 10/14/2019 08:36 AM     Lab Results   Component Value Date/Time    HGB 14.9 01/18/2021 12:25 PM    WBC 2.89 (L) 01/18/2021 12:25 PM    PLT 78 (L) 01/20/2021 06:17 AM    INR 1.00 01/20/2021 06:17 AM    INR 0.9 11/05/2020 02:43 PM    PTT 27 01/20/2021 06:18 AM     Lab Results   Component Value Date/Time    KKO3IGHXVINC 0.668 07/13/2021 09:07 AM     RF neg  CCP neg   EDY neg  dsDNA neg   CK normal   ESR normal   AST normal  ALT normal  Alk phos normal    I have personally reviewed notes, labs, and imaging available in the chart.     Tonya Kiran DO, CCD, [facility]

## 2025-04-24 ENCOUNTER — CONSULT (OUTPATIENT)
Age: 60
End: 2025-04-24
Payer: COMMERCIAL

## 2025-04-24 VITALS
HEART RATE: 125 BPM | DIASTOLIC BLOOD PRESSURE: 82 MMHG | TEMPERATURE: 97.6 F | OXYGEN SATURATION: 96 % | HEIGHT: 67 IN | SYSTOLIC BLOOD PRESSURE: 140 MMHG | WEIGHT: 125.6 LBS | BODY MASS INDEX: 19.71 KG/M2

## 2025-04-24 DIAGNOSIS — M79.10 MYALGIA: Primary | ICD-10-CM

## 2025-04-24 PROCEDURE — 99204 OFFICE O/P NEW MOD 45 MIN: CPT | Performed by: STUDENT IN AN ORGANIZED HEALTH CARE EDUCATION/TRAINING PROGRAM

## 2025-04-24 RX ORDER — METHYLPREDNISOLONE 4 MG/1
TABLET ORAL
COMMUNITY
Start: 2025-04-15

## 2025-04-24 RX ORDER — SULINDAC 200 MG/1
200 TABLET ORAL 2 TIMES DAILY
COMMUNITY
Start: 2025-04-18

## 2025-04-24 RX ORDER — THIAMINE HCL 50 MG
50 TABLET ORAL DAILY
COMMUNITY
Start: 2025-03-25

## 2025-04-24 RX ORDER — VILAZODONE HYDROCHLORIDE 20 MG/1
TABLET ORAL
COMMUNITY
Start: 2025-01-23

## 2025-04-24 RX ORDER — ROSUVASTATIN CALCIUM 10 MG/1
10 TABLET, COATED ORAL DAILY
COMMUNITY
Start: 2025-03-17

## 2025-04-24 RX ORDER — BACLOFEN 10 MG/1
10 TABLET ORAL
COMMUNITY
Start: 2025-03-14

## 2025-04-24 RX ORDER — FOLIC ACID 1 MG/1
1 TABLET ORAL DAILY
COMMUNITY
Start: 2025-03-18

## 2025-04-24 RX ORDER — METAXALONE 800 MG/1
800 TABLET ORAL
COMMUNITY
Start: 2025-04-18

## 2025-05-19 ENCOUNTER — HOSPITAL ENCOUNTER (EMERGENCY)
Facility: HOSPITAL | Age: 60
Discharge: HOME | End: 2025-05-19
Attending: STUDENT IN AN ORGANIZED HEALTH CARE EDUCATION/TRAINING PROGRAM
Payer: COMMERCIAL

## 2025-05-19 ENCOUNTER — APPOINTMENT (EMERGENCY)
Dept: CT IMAGING | Facility: HOSPITAL | Age: 60
End: 2025-05-19
Payer: COMMERCIAL

## 2025-05-19 VITALS
WEIGHT: 127 LBS | TEMPERATURE: 97.7 F | HEART RATE: 69 BPM | DIASTOLIC BLOOD PRESSURE: 85 MMHG | OXYGEN SATURATION: 97 % | RESPIRATION RATE: 18 BRPM | BODY MASS INDEX: 19.89 KG/M2 | SYSTOLIC BLOOD PRESSURE: 122 MMHG

## 2025-05-19 DIAGNOSIS — R51.9 HEADACHE: ICD-10-CM

## 2025-05-19 DIAGNOSIS — M54.2 NECK PAIN: Primary | ICD-10-CM

## 2025-05-19 LAB
ALBUMIN SERPL BCG-MCNC: 4.4 G/DL (ref 3.5–5)
ALP SERPL-CCNC: 61 U/L (ref 34–104)
ALT SERPL W P-5'-P-CCNC: 10 U/L (ref 7–52)
ANION GAP SERPL CALCULATED.3IONS-SCNC: 6 MMOL/L (ref 4–13)
AST SERPL W P-5'-P-CCNC: 14 U/L (ref 13–39)
BASOPHILS # BLD AUTO: 0.07 THOUSANDS/ÂΜL (ref 0–0.1)
BASOPHILS NFR BLD AUTO: 1 % (ref 0–1)
BILIRUB SERPL-MCNC: 0.43 MG/DL (ref 0.2–1)
BUN SERPL-MCNC: 10 MG/DL (ref 5–25)
CALCIUM SERPL-MCNC: 9.3 MG/DL (ref 8.4–10.2)
CHLORIDE SERPL-SCNC: 107 MMOL/L (ref 96–108)
CO2 SERPL-SCNC: 26 MMOL/L (ref 21–32)
CREAT SERPL-MCNC: 0.87 MG/DL (ref 0.6–1.3)
EOSINOPHIL # BLD AUTO: 0.19 THOUSAND/ÂΜL (ref 0–0.61)
EOSINOPHIL NFR BLD AUTO: 4 % (ref 0–6)
ERYTHROCYTE [DISTWIDTH] IN BLOOD BY AUTOMATED COUNT: 14.9 % (ref 11.6–15.1)
GFR SERPL CREATININE-BSD FRML MDRD: 93 ML/MIN/1.73SQ M
GLUCOSE SERPL-MCNC: 75 MG/DL (ref 65–140)
HCT VFR BLD AUTO: 43.9 % (ref 36.5–49.3)
HGB BLD-MCNC: 14.7 G/DL (ref 12–17)
IMM GRANULOCYTES # BLD AUTO: 0.01 THOUSAND/UL (ref 0–0.2)
IMM GRANULOCYTES NFR BLD AUTO: 0 % (ref 0–2)
LYMPHOCYTES # BLD AUTO: 1.74 THOUSANDS/ÂΜL (ref 0.6–4.47)
LYMPHOCYTES NFR BLD AUTO: 32 % (ref 14–44)
MCH RBC QN AUTO: 28.5 PG (ref 26.8–34.3)
MCHC RBC AUTO-ENTMCNC: 33.5 G/DL (ref 31.4–37.4)
MCV RBC AUTO: 85 FL (ref 82–98)
MONOCYTES # BLD AUTO: 0.57 THOUSAND/ÂΜL (ref 0.17–1.22)
MONOCYTES NFR BLD AUTO: 11 % (ref 4–12)
NEUTROPHILS # BLD AUTO: 2.83 THOUSANDS/ÂΜL (ref 1.85–7.62)
NEUTS SEG NFR BLD AUTO: 52 % (ref 43–75)
NRBC BLD AUTO-RTO: 0 /100 WBCS
PLATELET # BLD AUTO: 218 THOUSANDS/UL (ref 149–390)
PMV BLD AUTO: 9.5 FL (ref 8.9–12.7)
POTASSIUM SERPL-SCNC: 4.1 MMOL/L (ref 3.5–5.3)
PROT SERPL-MCNC: 6.8 G/DL (ref 6.4–8.4)
RBC # BLD AUTO: 5.16 MILLION/UL (ref 3.88–5.62)
SODIUM SERPL-SCNC: 139 MMOL/L (ref 135–147)
WBC # BLD AUTO: 5.41 THOUSAND/UL (ref 4.31–10.16)

## 2025-05-19 PROCEDURE — 96366 THER/PROPH/DIAG IV INF ADDON: CPT

## 2025-05-19 PROCEDURE — 80053 COMPREHEN METABOLIC PANEL: CPT | Performed by: STUDENT IN AN ORGANIZED HEALTH CARE EDUCATION/TRAINING PROGRAM

## 2025-05-19 PROCEDURE — 96365 THER/PROPH/DIAG IV INF INIT: CPT

## 2025-05-19 PROCEDURE — 96368 THER/DIAG CONCURRENT INF: CPT

## 2025-05-19 PROCEDURE — 99284 EMERGENCY DEPT VISIT MOD MDM: CPT

## 2025-05-19 PROCEDURE — 85025 COMPLETE CBC W/AUTO DIFF WBC: CPT | Performed by: STUDENT IN AN ORGANIZED HEALTH CARE EDUCATION/TRAINING PROGRAM

## 2025-05-19 PROCEDURE — 99284 EMERGENCY DEPT VISIT MOD MDM: CPT | Performed by: STUDENT IN AN ORGANIZED HEALTH CARE EDUCATION/TRAINING PROGRAM

## 2025-05-19 PROCEDURE — 70450 CT HEAD/BRAIN W/O DYE: CPT

## 2025-05-19 PROCEDURE — 36415 COLL VENOUS BLD VENIPUNCTURE: CPT | Performed by: STUDENT IN AN ORGANIZED HEALTH CARE EDUCATION/TRAINING PROGRAM

## 2025-05-19 PROCEDURE — 96375 TX/PRO/DX INJ NEW DRUG ADDON: CPT

## 2025-05-19 PROCEDURE — 72125 CT NECK SPINE W/O DYE: CPT

## 2025-05-19 RX ORDER — MAGNESIUM SULFATE HEPTAHYDRATE 40 MG/ML
2 INJECTION, SOLUTION INTRAVENOUS ONCE
Status: COMPLETED | OUTPATIENT
Start: 2025-05-19 | End: 2025-05-19

## 2025-05-19 RX ORDER — METOCLOPRAMIDE HYDROCHLORIDE 5 MG/ML
10 INJECTION INTRAMUSCULAR; INTRAVENOUS ONCE
Status: COMPLETED | OUTPATIENT
Start: 2025-05-19 | End: 2025-05-19

## 2025-05-19 RX ORDER — DEXAMETHASONE SODIUM PHOSPHATE 10 MG/ML
10 INJECTION, SOLUTION INTRAMUSCULAR; INTRAVENOUS ONCE
Status: COMPLETED | OUTPATIENT
Start: 2025-05-19 | End: 2025-05-19

## 2025-05-19 RX ORDER — DIPHENHYDRAMINE HYDROCHLORIDE 50 MG/ML
25 INJECTION, SOLUTION INTRAMUSCULAR; INTRAVENOUS ONCE
Status: COMPLETED | OUTPATIENT
Start: 2025-05-19 | End: 2025-05-19

## 2025-05-19 RX ORDER — ACETAMINOPHEN 10 MG/ML
1000 INJECTION, SOLUTION INTRAVENOUS ONCE
Status: COMPLETED | OUTPATIENT
Start: 2025-05-19 | End: 2025-05-19

## 2025-05-19 RX ADMIN — ACETAMINOPHEN 1000 MG: 10 INJECTION INTRAVENOUS at 11:33

## 2025-05-19 RX ADMIN — METOCLOPRAMIDE 10 MG: 5 INJECTION, SOLUTION INTRAMUSCULAR; INTRAVENOUS at 11:30

## 2025-05-19 RX ADMIN — MAGNESIUM SULFATE HEPTAHYDRATE 2 G: 40 INJECTION, SOLUTION INTRAVENOUS at 11:33

## 2025-05-19 RX ADMIN — DEXAMETHASONE SODIUM PHOSPHATE 10 MG: 10 INJECTION, SOLUTION INTRAMUSCULAR; INTRAVENOUS at 11:35

## 2025-05-19 RX ADMIN — DIPHENHYDRAMINE HYDROCHLORIDE 25 MG: 50 INJECTION, SOLUTION INTRAMUSCULAR; INTRAVENOUS at 11:29

## 2025-05-19 RX ADMIN — SODIUM CHLORIDE 500 ML: 0.9 INJECTION, SOLUTION INTRAVENOUS at 11:28

## 2025-05-19 NOTE — DISCHARGE INSTRUCTIONS
Continues over-the-counter medication as needed for discomfort.  Alternate between Tylenol and Motrin.    Follow-up with your primary care physician and specialist.    Return for any new or worsening symptoms.

## 2025-05-21 NOTE — ED PROVIDER NOTES
Time reflects when diagnosis was documented in both MDM as applicable and the Disposition within this note       Time User Action Codes Description Comment    5/19/2025  1:06 PM Heidi Bray Add [M54.2] Neck pain     5/19/2025  1:06 PM Heidi Bray Add [R51.9] Headache           ED Disposition       ED Disposition   Discharge    Condition   Stable    Date/Time   Mon May 19, 2025  1:06 PM    Comment   Ben Tilleysola discharge to home/self care.                   Assessment & Plan       Medical Decision Making  Differential concussion, muscle spasm, tension headache.    Patient is a well-appearing 59-year-old male present with department no acute respiratory distress and vital signs unremarkable.  Imaging obtained.  Findings are nonacute.  Discussed overall symptomatic management as well as return fall precautions.  Patient's examination remained stable throughout duration of stay.    Amount and/or Complexity of Data Reviewed  Independent Historian: parent  Labs: ordered.  Radiology: ordered.    Risk  Prescription drug management.             Medications   sodium chloride 0.9 % bolus 500 mL (0 mL Intravenous Stopped 5/19/25 1336)   dexamethasone (PF) (DECADRON) injection 10 mg (10 mg Intravenous Given 5/19/25 1135)   metoclopramide (REGLAN) injection 10 mg (10 mg Intravenous Given 5/19/25 1130)   diphenhydrAMINE (BENADRYL) injection 25 mg (25 mg Intravenous Given 5/19/25 1129)   magnesium sulfate 2 g/50 mL IVPB (premix) 2 g (0 g Intravenous Stopped 5/19/25 1336)   acetaminophen (Ofirmev) injection 1,000 mg (0 mg Intravenous Stopped 5/19/25 1336)       ED Risk Strat Scores                    No data recorded        SBIRT 22yo+      Flowsheet Row Most Recent Value   Initial Alcohol Screen: US AUDIT-C     1. How often do you have a drink containing alcohol? 0 Filed at: 05/19/2025 133   2. How many drinks containing alcohol do you have on a typical day you are drinking?  0 Filed at: 05/19/2025 1337   3a. Male  UNDER 65: How often do you have five or more drinks on one occasion? 0 Filed at: 05/19/2025 5153   Audit-C Score 0 Filed at: 05/19/2025 2677   JAE: How many times in the past year have you...    Used an illegal drug or used a prescription medication for non-medical reasons? Never Filed at: 05/19/2025 4817                            History of Present Illness       Chief Complaint   Patient presents with    Fall     Fell down 7 steps in home on Friday + HS back of head, -LOC -BT denies issues with vision, + headache        Past Medical History[1]   Past Surgical History[2]   Family History[3]   Social History[4]   E-Cigarette/Vaping    E-Cigarette Use Never User       E-Cigarette/Vaping Substances    Nicotine No     THC No     CBD No     Flavoring No     Other No     Unknown No       I have reviewed and agree with the history as documented.     HPI    Patient is a 59-year-old male present emerged department for headache and neck pain.  Patient fell down 3 stairs that were carpeted a few days ago.  He did strike the back of his head.  He did not lose consciousness.  Denies any nausea or vomiting.  Denies any vision changes or difficulty ambulating.  The pain has been persistent and comes in for further evaluation.    Review of Systems   Constitutional:  Negative for chills and fever.   HENT:  Negative for ear pain and sore throat.    Eyes:  Negative for pain and visual disturbance.   Respiratory:  Negative for cough and shortness of breath.    Cardiovascular:  Negative for chest pain and palpitations.   Gastrointestinal:  Negative for abdominal pain and vomiting.   Genitourinary:  Negative for dysuria and hematuria.   Musculoskeletal:  Positive for neck pain. Negative for arthralgias and back pain.   Skin:  Negative for color change and rash.   Neurological:  Positive for headaches. Negative for seizures and syncope.   All other systems reviewed and are negative.          Objective       ED Triage Vitals    Temperature Pulse Blood Pressure Respirations SpO2 Patient Position - Orthostatic VS   05/19/25 1020 05/19/25 1020 05/19/25 1020 05/19/25 1020 05/19/25 1020 --   97.7 °F (36.5 °C) 78 121/76 18 99 %       Temp src Heart Rate Source BP Location FiO2 (%) Pain Score    -- 05/19/25 1130 -- -- 05/19/25 1020     Monitor   8      Vitals      Date and Time Temp Pulse SpO2 Resp BP Pain Score FACES Pain Rating User   05/19/25 1330 -- 69 97 % 18 122/85 -- -- AM   05/19/25 1300 -- 68 97 % 18 127/87 -- -- AM   05/19/25 1130 -- 72 99 % 18 131/87 -- -- CB   05/19/25 1020 97.7 °F (36.5 °C) 78 99 % 18 121/76 8 -- AM            Physical Exam  Vitals and nursing note reviewed.   Constitutional:       General: He is not in acute distress.     Appearance: He is well-developed.   HENT:      Head: Normocephalic and atraumatic.     Eyes:      Conjunctiva/sclera: Conjunctivae normal.       Cardiovascular:      Rate and Rhythm: Normal rate and regular rhythm.      Heart sounds: No murmur heard.  Pulmonary:      Effort: Pulmonary effort is normal. No respiratory distress.      Breath sounds: Normal breath sounds.   Abdominal:      Palpations: Abdomen is soft.      Tenderness: There is no abdominal tenderness.     Musculoskeletal:         General: No swelling.      Cervical back: Neck supple.      Comments: Paraspinal tenderness on right     Skin:     General: Skin is warm and dry.      Capillary Refill: Capillary refill takes less than 2 seconds.     Neurological:      General: No focal deficit present.      Mental Status: He is alert and oriented to person, place, and time.      Comments: Cranial nerves II through XII intact.  Strength, sensation range of motion intact in bilateral upper and lower extremities.  Negative finger-nose-finger and heel-to-shin.     Psychiatric:         Mood and Affect: Mood normal.         Results Reviewed       Procedure Component Value Units Date/Time    Comprehensive metabolic panel [[id number]] Collected:  05/19/25 1127    Lab Status: Final result Specimen: Blood from Arm, Right Updated: 05/19/25 1209     Sodium 139 mmol/L      Potassium 4.1 mmol/L      Chloride 107 mmol/L      CO2 26 mmol/L      ANION GAP 6 mmol/L      BUN 10 mg/dL      Creatinine 0.87 mg/dL      Glucose 75 mg/dL      Calcium 9.3 mg/dL      AST 14 U/L      ALT 10 U/L      Alkaline Phosphatase 61 U/L      Total Protein 6.8 g/dL      Albumin 4.4 g/dL      Total Bilirubin 0.43 mg/dL      eGFR 93 ml/min/1.73sq m     Narrative:      [employer] Kid[employer] guidelines for Chronic Kidney Disease (CKD):     Stage 1 with normal or high GFR (GFR > 90 mL/min/1.73 square meters)    Stage 2 Mild CKD (GFR = 60-89 mL/min/1.73 square meters)    Stage 3A Moderate CKD (GFR = 45-59 mL/min/1.73 square meters)    Stage 3B Moderate CKD (GFR = 30-44 mL/min/1.73 square meters)    Stage 4 Severe CKD (GFR = 15-29 mL/min/1.73 square meters)    Stage 5 End Stage CKD (GFR <15 mL/min/1.73 square meters)  Note: GFR calculation is accurate only with a steady state creatinine    CBC and differential [[id number]] Collected: 05/19/25 1127    Lab Status: Final result Specimen: Blood from Arm, Right Updated: 05/19/25 1144     WBC 5.41 Thousand/uL      RBC 5.16 Million/uL      Hemoglobin 14.7 g/dL      Hematocrit 43.9 %      MCV 85 fL      MCH 28.5 pg      MCHC 33.5 g/dL      RDW 14.9 %      MPV 9.5 fL      Platelets 218 Thousands/uL      nRBC 0 /100 WBCs      Segmented % 52 %      Immature Grans % 0 %      Lymphocytes % 32 %      Monocytes % 11 %      Eosinophils Relative 4 %      Basophils Relative 1 %      Absolute Neutrophils 2.83 Thousands/µL      Absolute Immature Grans 0.01 Thousand/uL      Absolute Lymphocytes 1.74 Thousands/µL      Absolute Monocytes 0.57 Thousand/µL      Eosinophils Absolute 0.19 Thousand/µL      Basophils Absolute 0.07 Thousands/µL             CT spine cervical without contrast   Final Interpretation by Re Alicea MD (05/19 1256)      No acute  compression collapse of the vertebra      Degenerative disc disease at C5-6, C6/7                  Workstation performed: [id number]         CT head without contrast   Final Interpretation by Re Alicea MD (05/19 6482)      No acute intracranial abnormality.                  Workstation performed: [id number]             Procedures    ED Medication and Procedure Management   Prior to Admission Medications   Prescriptions Last Dose Informant Patient Reported? Taking?   Cholecalciferol 1.25 MG (16479 UT) capsule   Yes No   Sig: Take 1 capsule by mouth once a week   albuterol (PROVENTIL HFA,VENTOLIN HFA) 90 mcg/act inhaler  Self Yes No   Sig: Inhale 1 puff every 6 (six) hours as needed   baclofen 10 mg tablet   Yes No   Sig: Take 10 mg by mouth   celecoxib (CeleBREX) 200 mg capsule  Self Yes No   colestipol (COLESTID) 1 g tablet   No No   Sig: Take 2 tablets (2 g total) by mouth daily   diclofenac (VOLTAREN) 75 mg EC tablet  Self Yes No   escitalopram (LEXAPRO) 20 mg tablet  Self Yes No   folic acid (FOLVITE) 1 mg tablet   Yes No   Sig: Take 1 mg by mouth daily   gabapentin (NEURONTIN) 300 mg capsule   No No   Sig: Take 1 capsule (300 mg total) by mouth 3 (three) times a day   hydrOXYzine pamoate (VISTARIL) 25 mg capsule  Self Yes No   lidocaine (LIDODERM) 5 %   No No   Sig: Apply 1 patch topically daily Remove & Discard patch within 12 hours or as directed by MD   metaxalone (SKELAXIN) 800 mg tablet   Yes No   Sig: Take 800 mg by mouth   methylPREDNISolone 4 MG tablet therapy pack   Yes No   Sig: Follow package directions   metroNIDAZOLE (FLAGYL) 500 mg tablet  Self Yes No   mirtazapine (REMERON) 15 mg tablet   No No   Sig: Take 1 tablet (15 mg total) by mouth daily at bedtime   naltrexone (REVIA) 50 mg tablet  Self Yes No   nicotine (NICODERM CQ) 21 mg/24 hr TD 24 hr patch   No No   Sig: Place 1 patch on the skin daily at bedtime   rosuvastatin (CRESTOR) 10 MG tablet   Yes No   Sig: Take 10 mg by mouth daily    sulindac (CLINORIL) 200 MG tablet   Yes No   Sig: Take 200 mg by mouth 2 (two) times a day   thiamine (VITAMIN B1) 50 mg tablet   Yes No   Sig: Take 50 mg by mouth daily   vilazodone (VIIBRYD) 20 mg tablet   Yes No      Facility-Administered Medications: None     Discharge Medication List as of 5/19/2025  1:26 PM        CONTINUE these medications which have NOT CHANGED    Details   albuterol (PROVENTIL HFA,VENTOLIN HFA) 90 mcg/act inhaler Inhale 1 puff every 6 (six) hours as needed, Historical Med      baclofen 10 mg tablet Take 10 mg by mouth, Starting Fri 3/14/2025, Historical Med      celecoxib (CeleBREX) 200 mg capsule Starting Sat 6/19/2021, Historical Med      Cholecalciferol 1.25 MG (18648 UT) capsule Take 1 capsule by mouth once a week, Starting Wed 4/9/2025, Historical Med      colestipol (COLESTID) 1 g tablet Take 2 tablets (2 g total) by mouth daily, Starting Wed 10/27/2021, Normal      diclofenac (VOLTAREN) 75 mg EC tablet Starting Wed 10/13/2021, Historical Med      escitalopram (LEXAPRO) 20 mg tablet Starting Mon 7/5/2021, Historical Med      folic acid (FOLVITE) 1 mg tablet Take 1 mg by mouth daily, Starting Tue 3/18/2025, Historical Med      gabapentin (NEURONTIN) 300 mg capsule Take 1 capsule (300 mg total) by mouth 3 (three) times a day, Starting Fri 1/29/2021, Until Thu 5/13/2021, Print      hydrOXYzine pamoate (VISTARIL) 25 mg capsule Starting Mon 4/19/2021, Historical Med      lidocaine (LIDODERM) 5 % Apply 1 patch topically daily Remove & Discard patch within 12 hours or as directed by MD, Starting Fri 1/29/2021, Until Sun 2/28/2021, Print      metaxalone (SKELAXIN) 800 mg tablet Take 800 mg by mouth, Starting Fri 4/18/2025, Historical Med      methylPREDNISolone 4 MG tablet therapy pack Follow package directions, Starting Tue 4/15/2025, Historical Med      metroNIDAZOLE (FLAGYL) 500 mg tablet Starting Tue 4/20/2021, Historical Med      mirtazapine (REMERON) 15 mg tablet Take 1 tablet (15 mg  "total) by mouth daily at bedtime, Starting Fri 1/29/2021, Until Thu 5/13/2021, Print      naltrexone (REVIA) 50 mg tablet Starting Wed 4/7/2021, Historical Med      nicotine (NICODERM CQ) 21 mg/24 hr TD 24 hr patch Place 1 patch on the skin daily at bedtime, Starting Fri 1/29/2021, Until Sun 2/28/2021, Print      rosuvastatin (CRESTOR) 10 MG tablet Take 10 mg by mouth daily, Starting Mon 3/17/2025, Historical Med      sulindac (CLINORIL) 200 MG tablet Take 200 mg by mouth 2 (two) times a day, Starting Fri 4/18/2025, Historical Med      thiamine (VITAMIN B1) 50 mg tablet Take 50 mg by mouth daily, Starting Tue 3/25/2025, Historical Med      vilazodone (VIIBRYD) 20 mg tablet Historical Med           No discharge procedures on file.  ED SEPSIS DOCUMENTATION   Time reflects when diagnosis was documented in both MDM as applicable and the Disposition within this note       Time User Action Codes Description Comment    5/19/2025  1:06 PM Heidi Bray [M54.2] Neck pain     5/19/2025  1:06 PM Heidi Bray [R51.9] Headache                    [1]   Past Medical History:  Diagnosis Date    Pulmonary emphysema (HCC)     Substance abuse (HCC)    [2]   Past Surgical History:  Procedure Laterality Date    CARPAL TUNNEL RELEASE Right 12/2020    HERNIA REPAIR      x2 when young    WISDOM TOOTH EXTRACTION     [3]   Family History  Problem Relation Name Age of Onset    No Known Problems Mother      Cancer Father     [4]   Social History  Tobacco Use    Smoking status: Every Day     Current packs/day: 1.00     Types: Cigarettes    Smokeless tobacco: Never   Vaping Use    Vaping status: Never Used   Substance Use Topics    Alcohol use: Not Currently     Comment: patinet reprots drinking daily about a \"handle\" of vodka straight     Drug use: Not Currently        Heidi Bray DO  05/21/25 1611    "

## 2025-06-30 ENCOUNTER — TELEPHONE (OUTPATIENT)
Age: 60
End: 2025-06-30

## 2025-06-30 NOTE — TELEPHONE ENCOUNTER
Caller: Tawnya Mom    Doctor: Dr. Beebe     Reason for call: [facility]Effective [facility]/2020) the Mom will bring in the insurance card at the appt . It was not available when the appt was made     Call back#: [phone]

## 2025-07-31 ENCOUNTER — TELEPHONE (OUTPATIENT)
Dept: PAIN MEDICINE | Facility: CLINIC | Age: 60
End: 2025-07-31

## 2025-08-01 ENCOUNTER — OFFICE VISIT (OUTPATIENT)
Dept: PAIN MEDICINE | Facility: CLINIC | Age: 60
End: 2025-08-01
Payer: COMMERCIAL

## 2025-08-01 ENCOUNTER — TELEPHONE (OUTPATIENT)
Dept: PAIN MEDICINE | Facility: CLINIC | Age: 60
End: 2025-08-01

## 2025-08-01 VITALS — HEIGHT: 67 IN | WEIGHT: 135.2 LBS | BODY MASS INDEX: 21.22 KG/M2

## 2025-08-01 DIAGNOSIS — G89.4 CHRONIC PAIN SYNDROME: Primary | ICD-10-CM

## 2025-08-01 DIAGNOSIS — R52 TOTAL BODY PAIN: ICD-10-CM

## 2025-08-01 DIAGNOSIS — M79.10 MYALGIA: ICD-10-CM

## 2025-08-01 PROCEDURE — 99204 OFFICE O/P NEW MOD 45 MIN: CPT | Performed by: STUDENT IN AN ORGANIZED HEALTH CARE EDUCATION/TRAINING PROGRAM

## 2025-08-01 RX ORDER — QUETIAPINE FUMARATE 50 MG/1
50 TABLET, FILM COATED ORAL DAILY
COMMUNITY
Start: 2025-07-28

## 2025-08-01 RX ORDER — CYCLOBENZAPRINE HCL 10 MG
10 TABLET ORAL DAILY
COMMUNITY
Start: 2025-06-27

## 2025-08-01 RX ORDER — AMITRIPTYLINE HYDROCHLORIDE 10 MG/1
TABLET ORAL
COMMUNITY
Start: 2025-06-11

## 2025-08-01 RX ORDER — PREGABALIN 75 MG/1
75 CAPSULE ORAL 2 TIMES DAILY
Qty: 60 CAPSULE | Refills: 0 | Status: SHIPPED | OUTPATIENT
Start: 2025-08-01

## 2025-08-01 RX ORDER — DULOXETIN HYDROCHLORIDE 20 MG/1
40 CAPSULE, DELAYED RELEASE ORAL DAILY
COMMUNITY
Start: 2025-07-12

## 2025-08-01 RX ORDER — BUSPIRONE HYDROCHLORIDE 15 MG/1
15 TABLET ORAL
COMMUNITY
Start: 2025-06-16

## 2025-08-01 RX ORDER — PREDNISONE 20 MG/1
TABLET ORAL
COMMUNITY
Start: 2025-07-12 | End: 2025-08-01 | Stop reason: ALTCHOICE